# Patient Record
Sex: FEMALE | Race: WHITE | NOT HISPANIC OR LATINO | Employment: OTHER | URBAN - NONMETROPOLITAN AREA
[De-identification: names, ages, dates, MRNs, and addresses within clinical notes are randomized per-mention and may not be internally consistent; named-entity substitution may affect disease eponyms.]

---

## 2017-03-24 ENCOUNTER — OFFICE VISIT (OUTPATIENT)
Dept: CARDIOLOGY | Facility: CLINIC | Age: 72
End: 2017-03-24

## 2017-03-24 DIAGNOSIS — I25.5 ISCHEMIC CARDIOMYOPATHY: Primary | ICD-10-CM

## 2017-03-24 PROCEDURE — 93289 INTERROG DEVICE EVAL HEART: CPT | Performed by: INTERNAL MEDICINE

## 2017-10-27 ENCOUNTER — OFFICE VISIT (OUTPATIENT)
Dept: CARDIOLOGY | Facility: CLINIC | Age: 72
End: 2017-10-27

## 2017-10-27 DIAGNOSIS — I25.5 ISCHEMIC CARDIOMYOPATHY: Primary | ICD-10-CM

## 2017-10-27 PROCEDURE — 93289 INTERROG DEVICE EVAL HEART: CPT | Performed by: INTERNAL MEDICINE

## 2018-04-10 ENCOUNTER — OFFICE VISIT (OUTPATIENT)
Dept: CARDIOLOGY | Facility: CLINIC | Age: 73
End: 2018-04-10

## 2018-04-10 VITALS
BODY MASS INDEX: 23.05 KG/M2 | HEART RATE: 78 BPM | SYSTOLIC BLOOD PRESSURE: 180 MMHG | HEIGHT: 64 IN | OXYGEN SATURATION: 96 % | DIASTOLIC BLOOD PRESSURE: 86 MMHG | WEIGHT: 135 LBS

## 2018-04-10 DIAGNOSIS — R06.02 SHORTNESS OF BREATH: ICD-10-CM

## 2018-04-10 DIAGNOSIS — R07.2 PRECORDIAL PAIN: Primary | ICD-10-CM

## 2018-04-10 DIAGNOSIS — R00.2 PALPITATION: ICD-10-CM

## 2018-04-10 PROCEDURE — 93000 ELECTROCARDIOGRAM COMPLETE: CPT | Performed by: PHYSICIAN ASSISTANT

## 2018-04-10 PROCEDURE — 99214 OFFICE O/P EST MOD 30 MIN: CPT | Performed by: PHYSICIAN ASSISTANT

## 2018-04-10 RX ORDER — LINACLOTIDE 145 UG/1
CAPSULE, GELATIN COATED ORAL DAILY
COMMUNITY
Start: 2018-03-20 | End: 2021-01-01

## 2018-04-10 RX ORDER — NITROGLYCERIN 0.4 MG/1
TABLET SUBLINGUAL
Qty: 100 TABLET | Refills: 11 | Status: SHIPPED | OUTPATIENT
Start: 2018-04-10

## 2018-04-10 RX ORDER — MEMANTINE HYDROCHLORIDE 10 MG/1
10 TABLET ORAL DAILY
COMMUNITY
Start: 2018-03-20

## 2018-04-10 RX ORDER — DOCUSATE SODIUM 100 MG/1
100 CAPSULE, LIQUID FILLED ORAL 2 TIMES DAILY
COMMUNITY
End: 2020-03-18

## 2018-04-10 RX ORDER — LORATADINE 10 MG/1
CAPSULE, LIQUID FILLED ORAL DAILY
COMMUNITY
End: 2020-03-18

## 2018-04-10 RX ORDER — LISINOPRIL 40 MG/1
TABLET ORAL DAILY
COMMUNITY
Start: 2018-03-20 | End: 2021-01-01

## 2018-04-10 RX ORDER — METHOCARBAMOL 750 MG/1
750 TABLET, FILM COATED ORAL 3 TIMES DAILY
COMMUNITY
End: 2021-01-01

## 2018-04-10 RX ORDER — RISPERIDONE 0.5 MG/1
TABLET ORAL 2 TIMES DAILY
COMMUNITY
Start: 2018-03-20

## 2018-04-10 RX ORDER — RANITIDINE 150 MG/1
TABLET ORAL 2 TIMES DAILY
COMMUNITY
Start: 2018-03-20 | End: 2021-01-01

## 2018-04-10 RX ORDER — CLOPIDOGREL BISULFATE 75 MG/1
TABLET ORAL DAILY
COMMUNITY
Start: 2018-03-20 | End: 2020-02-28 | Stop reason: SDUPTHER

## 2018-04-10 RX ORDER — ONDANSETRON 4 MG/1
TABLET, FILM COATED ORAL
COMMUNITY
Start: 2018-03-28 | End: 2020-03-18

## 2018-04-10 RX ORDER — OXYCODONE HYDROCHLORIDE 15 MG/1
TABLET ORAL
COMMUNITY
Start: 2018-03-29 | End: 2020-03-18

## 2018-04-10 RX ORDER — ESTRADIOL 0.1 MG/G
CREAM VAGINAL AS NEEDED
COMMUNITY
Start: 2018-02-21 | End: 2020-03-18

## 2018-04-10 RX ORDER — SPIRONOLACTONE 25 MG/1
TABLET ORAL DAILY
COMMUNITY
Start: 2018-02-06 | End: 2020-03-18

## 2018-04-10 RX ORDER — CITALOPRAM 20 MG/1
20 TABLET ORAL DAILY
COMMUNITY
Start: 2018-02-06

## 2018-04-10 NOTE — PROGRESS NOTES
Subjective   Niki Thompson is a 72 y.o. female     Chief Complaint   Patient presents with   • Coronary Artery Disease   • Hyperlipidemia   • Hypertension   • Peripheral Vascular Disease   • COPD   • Palpitations     Here for eval.       HPI    Problem List  1)CAD s/p stenting of the LAD and RCA inD distant past   a. Follow up cath in 2013 revealed moderate LAD disease but nonobstructive otherwise  2)Hypertension  3)Dyslipidemia  4)Hx of ischemic cardiomyopathy but recent echo showing preserved systolic fxn  4)Severe hearing loss  5.  Dementia     Patient is a 72-year-old lady that presents back for follow-up.  She is accompanied by her daughter.  Patient has been experiencing intermittent episodes of chest discomfort.  She describes a slight pressure but resolves.  She does not go into detail about further characteristics of her pain.  She is experienced shortness of breath when exerting but this appears to be a chronic issue.  No PND orthopnea.  No palpitations or dysrhythmic symptoms.  Family was concerned because the patient's chest pain and shortness of breath and wanted further evaluation      Current Outpatient Prescriptions   Medication Sig Dispense Refill   • citalopram (CeleXA) 40 MG tablet Daily.     • clopidogrel (PLAVIX) 75 MG tablet Daily.     • docusate sodium (COLACE) 100 MG capsule Take 100 mg by mouth 2 (Two) Times a Day.     • ESTRACE VAGINAL 0.1 MG/GM vaginal cream As Needed.     • LINZESS 145 MCG capsule capsule Daily.     • lisinopril (PRINIVIL,ZESTRIL) 40 MG tablet Daily.     • Loratadine (CLARITIN) 10 MG capsule Take  by mouth Daily.     • LYRICA 50 MG capsule Daily.     • memantine (NAMENDA) 10 MG tablet Daily.     • methocarbamol (ROBAXIN) 750 MG tablet Take 750 mg by mouth As Needed for Muscle Spasms.     • metoprolol tartrate (LOPRESSOR) 25 MG tablet Daily.     • ondansetron (ZOFRAN) 4 MG tablet As needed     • oxyCODONE (ROXICODONE) 15 MG immediate release tablet Prn     • raNITIdine  (ZANTAC) 150 MG tablet 2 (Two) Times a Day.     • risperiDONE (risperDAL) 0.5 MG tablet 2 (Two) Times a Day.     • spironolactone (ALDACTONE) 25 MG tablet Daily.       No current facility-administered medications for this visit.        Indomethacin; Macrolides and ketolides; Penicillins; Prevacid [lansoprazole]; Rocephin [ceftriaxone]; Sulfa antibiotics; and Tetracyclines & related    Past Medical History:   Diagnosis Date   • CAD (coronary artery disease), native coronary artery 8/25/2016   • Chest pain 8/25/2016   • COPD (chronic obstructive pulmonary disease) 8/25/2016   • Coronary artery stenosis 8/25/2016   • Dyslipidemia 8/25/2016   • Esophageal reflux 8/25/2016   • Hyperlipidemia 8/25/2016   • Hypertension 8/25/2016   • Ischemic cardiomyopathy 8/25/2016   • Osteoarthritis 8/25/2016   • Peripheral vascular disease 8/25/2016   • STEMI (ST elevation myocardial infarction) 8/25/2016       Social History     Social History   • Marital status:      Spouse name: N/A   • Number of children: N/A   • Years of education: N/A     Occupational History   • Not on file.     Social History Main Topics   • Smoking status: Former Smoker   • Smokeless tobacco: Never Used   • Alcohol use No   • Drug use: No   • Sexual activity: Not on file     Other Topics Concern   • Not on file     Social History Narrative   • No narrative on file           Family History   Problem Relation Age of Onset   • Heart attack Mother    • Heart attack Father    • Heart attack Sister    • Heart attack Brother    • Diabetes Other    • Coronary artery disease Other    • Seizures Other    • Heart disease Other    • Hyperlipidemia Other    • Hypertension Other    • Stroke Other        Review of Systems   Constitutional: Positive for fatigue.   HENT: Positive for hearing loss.    Eyes: Positive for visual disturbance (glasses prn).   Respiratory: Positive for shortness of breath.    Cardiovascular: Positive for chest pain, palpitations and leg  "swelling.   Gastrointestinal: Positive for nausea.   Endocrine: Negative.    Genitourinary: Positive for frequency.   Musculoskeletal: Positive for arthralgias and myalgias.   Skin: Negative.    Allergic/Immunologic: Positive for environmental allergies.   Neurological: Positive for dizziness and light-headedness.   Hematological: Bruises/bleeds easily.   Psychiatric/Behavioral: Negative.        Objective   Vitals:    04/10/18 1124   BP: 180/86   BP Location: Left arm   Patient Position: Sitting   Pulse: 78   SpO2: 96%   Weight: 61.2 kg (135 lb)   Height: 162.6 cm (64\")      /86 (BP Location: Left arm, Patient Position: Sitting)   Pulse 78   Ht 162.6 cm (64\")   Wt 61.2 kg (135 lb)   SpO2 96%   BMI 23.17 kg/m²     Lab Results (most recent)     None          Physical Exam   Constitutional: She is oriented to person, place, and time. She appears well-developed and well-nourished. No distress.   HENT:   Head: Normocephalic and atraumatic.   Eyes: EOM are normal. Pupils are equal, round, and reactive to light.   Neck: No JVD present.   Cardiovascular: Normal rate, regular rhythm, normal heart sounds and intact distal pulses.  Exam reveals no gallop and no friction rub.    No murmur heard.  Pulmonary/Chest: Effort normal and breath sounds normal. No respiratory distress. She has no wheezes. She has no rales. She exhibits no tenderness.   Musculoskeletal: Normal range of motion. She exhibits no edema.   Neurological: She is alert and oriented to person, place, and time. No cranial nerve deficit.   Skin: Skin is warm and dry. No rash noted. No erythema. No pallor.   Psychiatric: She has a normal mood and affect. Her behavior is normal.   Nursing note and vitals reviewed.      Procedure     ECG 12 Lead  Date/Time: 4/10/2018 11:32 AM  Performed by: FEDE AGUILAR  Authorized by: FEDE AGUILAR   Comments: EKG demonstrates sinus rhythm at 69 bpm, ST depression in the anterior lateral leads at " baseline                 Assessment/Plan     Problems Addressed this Visit        Cardiovascular and Mediastinum    Palpitation    Relevant Orders    ECG 12 Lead       Respiratory    Shortness of breath    Relevant Orders    ECG 12 Lead       Nervous and Auditory    Chest pain - Primary    Relevant Orders    ECG 12 Lead      Other Visit Diagnoses    None.             Recommendation  1.  Patient and her family would like further evaluation because of her chest discomfort and shortness of breath.  Therefore, we will schedule for an ischemia asthma.  Stress test was ordered.  Echocardiogram to evaluate LV function.  2.  We recommend her continuing antiplatelet therapy.  We would like her on statin therapy department she has had issues with these in the past.  Because of her history of coronary artery disease, we recommended her being on statin therapy.  They would like to continue her current medical regimen as of now.  3.  I am sending in nitroglycerin as needed for chest pain.  Any chest pain or supple nitroglycerin, she will go to the ER.  Follow-up with primary as scheduled              Discussed the patient's BMI with her. BMI is within normal parameters. No follow-up required.         Electronically signed by:

## 2018-05-02 ENCOUNTER — HOSPITAL ENCOUNTER (OUTPATIENT)
Dept: CARDIOLOGY | Facility: HOSPITAL | Age: 73
Discharge: HOME | End: 2018-05-02

## 2018-05-02 ENCOUNTER — APPOINTMENT (OUTPATIENT)
Dept: CARDIOLOGY | Facility: HOSPITAL | Age: 73
End: 2018-05-02

## 2018-05-02 ENCOUNTER — HOSPITAL ENCOUNTER (OUTPATIENT)
Dept: CARDIOLOGY | Facility: HOSPITAL | Age: 73
End: 2018-05-02

## 2018-06-05 ENCOUNTER — OFFICE VISIT (OUTPATIENT)
Dept: CARDIOLOGY | Facility: CLINIC | Age: 73
End: 2018-06-05

## 2018-06-05 VITALS
SYSTOLIC BLOOD PRESSURE: 162 MMHG | HEART RATE: 62 BPM | OXYGEN SATURATION: 95 % | BODY MASS INDEX: 23.39 KG/M2 | DIASTOLIC BLOOD PRESSURE: 83 MMHG | HEIGHT: 64 IN | WEIGHT: 137 LBS

## 2018-06-05 DIAGNOSIS — R06.02 SHORTNESS OF BREATH: ICD-10-CM

## 2018-06-05 DIAGNOSIS — R07.2 PRECORDIAL PAIN: Primary | ICD-10-CM

## 2018-06-05 DIAGNOSIS — I25.10 CORONARY ARTERY DISEASE INVOLVING NATIVE CORONARY ARTERY OF NATIVE HEART WITHOUT ANGINA PECTORIS: ICD-10-CM

## 2018-06-05 PROCEDURE — 99214 OFFICE O/P EST MOD 30 MIN: CPT | Performed by: PHYSICIAN ASSISTANT

## 2018-06-05 NOTE — PATIENT INSTRUCTIONS
Obesity, Adult  Obesity is the condition of having too much total body fat. Being overweight or obese means that your weight is greater than what is considered healthy for your body size. Obesity is determined by a measurement called BMI. BMI is an estimate of body fat and is calculated from height and weight. For adults, a BMI of 30 or higher is considered obese.  Obesity can eventually lead to other health concerns and major illnesses, including:  · Stroke.  · Coronary artery disease (CAD).  · Type 2 diabetes.  · Some types of cancer, including cancers of the colon, breast, uterus, and gallbladder.  · Osteoarthritis.  · High blood pressure (hypertension).  · High cholesterol.  · Sleep apnea.  · Gallbladder stones.  · Infertility problems.  What are the causes?  The main cause of obesity is taking in (consuming) more calories than your body uses for energy. Other factors that contribute to this condition may include:  · Being born with genes that make you more likely to become obese.  · Having a medical condition that causes obesity. These conditions include:  ¨ Hypothyroidism.  ¨ Polycystic ovarian syndrome (PCOS).  ¨ Binge-eating disorder.  ¨ Cushing syndrome.  · Taking certain medicines, such as steroids, antidepressants, and seizure medicines.  · Not being physically active (sedentary lifestyle).  · Living where there are limited places to exercise safely or buy healthy foods.  · Not getting enough sleep.  What increases the risk?  The following factors may increase your risk of this condition:  · Having a family history of obesity.  · Being a woman of -American descent.  · Being a man of  descent.  What are the signs or symptoms?  Having excessive body fat is the main symptom of this condition.  How is this diagnosed?  This condition may be diagnosed based on:  · Your symptoms.  · Your medical history.  · A physical exam. Your health care provider may measure:  ¨ Your BMI. If you are an adult  with a BMI between 25 and less than 30, you are considered overweight. If you are an adult with a BMI of 30 or higher, you are considered obese.  ¨ The distances around your hips and your waist (circumferences). These may be compared to each other to help diagnose your condition.  ¨ Your skinfold thickness. Your health care provider may gently pinch a fold of your skin and measure it.  How is this treated?  Treatment for this condition often includes changing your lifestyle. Treatment may include some or all of the following:  · Dietary changes. Work with your health care provider and a dietitian to set a weight-loss goal that is healthy and reasonable for you. Dietary changes may include eating:  ¨ Smaller portions. A portion size is the amount of a particular food that is healthy for you to eat at one time. This varies from person to person.  ¨ Low-calorie or low-fat options.  ¨ More whole grains, fruits, and vegetables.  · Regular physical activity. This may include aerobic activity (cardio) and strength training.  · Medicine to help you lose weight. Your health care provider may prescribe medicine if you are unable to lose 1 pound a week after 6 weeks of eating more healthily and doing more physical activity.  · Surgery. Surgical options may include gastric banding and gastric bypass. Surgery may be done if:  ¨ Other treatments have not helped to improve your condition.  ¨ You have a BMI of 40 or higher.  ¨ You have life-threatening health problems related to obesity.  Follow these instructions at home:     Eating and drinking     · Follow recommendations from your health care provider about what you eat and drink. Your health care provider may advise you to:  ¨ Limit fast foods, sweets, and processed snack foods.  ¨ Choose low-fat options, such as low-fat milk instead of whole milk.  ¨ Eat 5 or more servings of fruits or vegetables every day.  ¨ Eat at home more often. This gives you more control over what you  eat.  ¨ Choose healthy foods when you eat out.  ¨ Learn what a healthy portion size is.  ¨ Keep low-fat snacks on hand.  ¨ Avoid sugary drinks, such as soda, fruit juice, iced tea sweetened with sugar, and flavored milk.  ¨ Eat a healthy breakfast.  · Drink enough water to keep your urine clear or pale yellow.  · Do not go without eating for long periods of time (do not fast) or follow a fad diet. Fasting and fad diets can be unhealthy and even dangerous.  Physical Activity   · Exercise regularly, as told by your health care provider. Ask your health care provider what types of exercise are safe for you and how often you should exercise.  · Warm up and stretch before being active.  · Cool down and stretch after being active.  · Rest between periods of activity.  Lifestyle   · Limit the time that you spend in front of your TV, computer, or video game system.  · Find ways to reward yourself that do not involve food.  · Limit alcohol intake to no more than 1 drink a day for nonpregnant women and 2 drinks a day for men. One drink equals 12 oz of beer, 5 oz of wine, or 1½ oz of hard liquor.  General instructions   · Keep a weight loss journal to keep track of the food you eat and how much you exercise you get.  · Take over-the-counter and prescription medicines only as told by your health care provider.  · Take vitamins and supplements only as told by your health care provider.  · Consider joining a support group. Your health care provider may be able to recommend a support group.  · Keep all follow-up visits as told by your health care provider. This is important.  Contact a health care provider if:  · You are unable to meet your weight loss goal after 6 weeks of dietary and lifestyle changes.  This information is not intended to replace advice given to you by your health care provider. Make sure you discuss any questions you have with your health care provider.  Document Released: 01/25/2006 Document Revised:  05/22/2017 Document Reviewed: 10/05/2016  [employer] Interactive Patient Education © 2017 [employer]  MyPlate from [employer]  The general, healthful diet is based on the 2010 Dietary Guidelines for Americans. The amount of food you need to eat from each food group depends on your age, sex, and level of physical activity and can be individualized by a dietitian. Go to ChooseMyPlate.gov for more information.  What do I need to know about the MyPlate plan?  · Enjoy your food, but eat less.  · Avoid oversized portions.  ¨ ½ of your plate should include fruits and vegetables.  ¨ ¼ of your plate should be grains.  ¨ ¼ of your plate should be protein.  Grains   · Make at least half of your grains whole grains.  · For a 2,000 calorie daily food plan, eat 6 oz every day.  · 1 oz is about 1 slice bread, 1 cup cereal, or ½ cup cooked rice, cereal, or pasta.  Vegetables   · Make half your plate fruits and vegetables.  · For a 2,000 calorie daily food plan, eat 2½ cups every day.  · 1 cup is about 1 cup raw or cooked vegetables or vegetable juice or 2 cups raw leafy greens.  Fruits   · Make half your plate fruits and vegetables.  · For a 2,000 calorie daily food plan, eat 2 cups every day.  · 1 cup is about 1 cup fruit or 100% fruit juice or ½ cup dried fruit.  Protein   · For a 2,000 calorie daily food plan, eat 5½ oz every day.  · 1 oz is about 1 oz meat, poultry, or fish, ¼ cup cooked beans, 1 egg, 1 Tbsp peanut butter, or ½ oz nuts or seeds.  Dairy   · Switch to fat-free or low-fat (1%) milk.  · For a 2,000 calorie daily food plan, eat 3 cups every day.  · 1 cup is about 1 cup milk or yogurt or soy milk (soy beverage), 1½ oz natural cheese, or 2 oz processed cheese.  Fats, Oils, and Empty Calories   · Only small amounts of oils are recommended.  · Empty calories are calories from solid fats or added sugars.  · Compare sodium in foods like soup, bread, and frozen meals. Choose the foods with lower numbers.  · Drink water instead  of sugary drinks.  What foods can I eat?  Grains   Whole grains such as whole wheat, quinoa, millet, and bulgur. Bread, rolls, and pasta made from whole grains. Brown or wild rice. Hot or cold cereals made from whole grains and without added sugar.  Vegetables   All fresh vegetables, especially fresh red, dark green, or orange vegetables. Peas and beans. Low-sodium frozen or canned vegetables prepared without added salt. Low-sodium vegetable juices.  Fruits   All fresh, frozen, and dried fruits. Canned fruit packed in water or fruit juice without added sugar. Fruit juices without added sugar.  Meats and Other Protein Sources   Boiled, baked, or grilled lean meat trimmed of fat. Skinless poultry. Fresh seafood and shellfish. Canned seafood packed in water. Unsalted nuts and unsalted nut butters. Tofu. Dried beans and pea. Eggs.  Dairy   Low-fat or fat-free milk, yogurt, and cheeses.  Sweets and Desserts   Frozen desserts made from low-fat milk.  Fats and Oils   Olive, peanut, and canola oils and margarine. Salad dressing and mayonnaise made from these oils.  Other   Soups and casseroles made from allowed ingredients and without added fat or salt.  The items listed above may not be a complete list of recommended foods or beverages. Contact your dietitian for more options.   What foods are not recommended?  Grains   Sweetened, low-fiber cereals. Packaged baked goods. Snack crackers and chips. Cheese crackers, butter crackers, and biscuits. Frozen waffles, sweet breads, doughnuts, pastries, packaged baking mixes, pancakes, cakes, and cookies.  Vegetables   Regular canned or frozen vegetables or vegetables prepared with salt. Canned tomatoes. Canned tomato sauce. Fried vegetables. Vegetables in cream sauce or cheese sauce.  Fruits   Fruits packed in syrup or made with added sugar.  Meats and Other Protein Sources   Marbled or fatty meats such as ribs. Poultry with skin. Fried meats, poultry, eggs, or fish. Sausages, hot  dogs, and deli meats such as pastrami, bologna, or salami.  Dairy   Whole milk, cream, cheeses made from whole milk, sour cream. Ice cream or yogurt made from whole milk or with added sugar.  Beverages   For adults, no more than one alcoholic drink per day. Regular soft drinks or other sugary beverages. Juice drinks.  Sweets and Desserts   Sugary or fatty desserts, candy, and other sweets.  Fats and Oils   Solid shortening or partially hydrogenated oils. Solid margarine. Margarine that contains trans fats. Butter.  The items listed above may not be a complete list of foods and beverages to avoid. Contact your dietitian for more information.   This information is not intended to replace advice given to you by your health care provider. Make sure you discuss any questions you have with your health care provider.  Document Released: 01/06/2009 Document Revised: 05/25/2017 Document Reviewed: 11/26/2014  [employer] Interactive Patient Education © 2017 [employer]  For more information:    Quit Now [place]  1-800-QUIT-NOW  https://[place].quitlogix.org/en-[place]/  Steps to Quit Smoking  Smoking tobacco can be harmful to your health and can affect almost every organ in your body. Smoking puts you, and those around you, at risk for developing many serious chronic diseases. Quitting smoking is difficult, but it is one of the best things that you can do for your health. It is never too late to quit.  What are the benefits of quitting smoking?  When you quit smoking, you lower your risk of developing serious diseases and conditions, such as:  · Lung cancer or lung disease, such as COPD.  · Heart disease.  · Stroke.  · Heart attack.  · Infertility.  · Osteoporosis and bone fractures.  Additionally, symptoms such as coughing, wheezing, and shortness of breath may get better when you quit. You may also find that you get sick less often because your body is stronger at fighting off colds and infections. If you are pregnant, quitting  smoking can help to reduce your chances of having a baby of low birth weight.  How do I get ready to quit?  When you decide to quit smoking, create a plan to make sure that you are successful. Before you quit:  · Pick a date to quit. Set a date within the next two weeks to give you time to prepare.  · Write down the reasons why you are quitting. Keep this list in places where you will see it often, such as on your bathroom mirror or in your car or wallet.  · Identify the people, places, things, and activities that make you want to smoke (triggers) and avoid them. Make sure to take these actions:  ¨ Throw away all cigarettes at home, at work, and in your car.  ¨ Throw away smoking accessories, such as ashtrays and lighters.  ¨ Clean your car and make sure to empty the ashtray.  ¨ Clean your home, including curtains and carpets.  · Tell your family, friends, and coworkers that you are quitting. Support from your loved ones can make quitting easier.  · Talk with your health care provider about your options for quitting smoking.  · Find out what treatment options are covered by your health insurance.  What strategies can I use to quit smoking?  Talk with your healthcare provider about different strategies to quit smoking. Some strategies include:  · Quitting smoking altogether instead of gradually lessening how much you smoke over a period of time. Research shows that quitting “cold turkey” is more successful than gradually quitting.  · Attending in-person counseling to help you build problem-solving skills. You are more likely to have success in quitting if you attend several counseling sessions. Even short sessions of 10 minutes can be effective.  · Finding resources and support systems that can help you to quit smoking and remain smoke-free after you quit. These resources are most helpful when you use them often. They can include:  ¨ Online chats with a counselor.  ¨ Telephone quitlines.  ¨ Printed self-help  materials.  ¨ Support groups or group counseling.  ¨ Text messaging programs.  ¨ Mobile phone applications.  · Taking medicines to help you quit smoking. (If you are pregnant or breastfeeding, talk with your health care provider first.) Some medicines contain nicotine and some do not. Both types of medicines help with cravings, but the medicines that include nicotine help to relieve withdrawal symptoms. Your health care provider may recommend:  ¨ Nicotine patches, gum, or lozenges.  ¨ Nicotine inhalers or sprays.  ¨ Non-nicotine medicine that is taken by mouth.  Talk with your health care provider about combining strategies, such as taking medicines while you are also receiving in-person counseling. Using these two strategies together makes you more likely to succeed in quitting than if you used either strategy on its own.  If you are pregnant or breastfeeding, talk with your health care provider about finding counseling or other support strategies to quit smoking. Do not take medicine to help you quit smoking unless told to do so by your health care provider.  What things can I do to make it easier to quit?  Quitting smoking might feel overwhelming at first, but there is a lot that you can do to make it easier. Take these important actions:  · Reach out to your family and friends and ask that they support and encourage you during this time. Call telephone quitlines, reach out to support groups, or work with a counselor for support.  · Ask people who smoke to avoid smoking around you.  · Avoid places that trigger you to smoke, such as bars, parties, or smoke-break areas at work.  · Spend time around people who do not smoke.  · Lessen stress in your life, because stress can be a smoking trigger for some people. To lessen stress, try:  ¨ Exercising regularly.  ¨ Deep-breathing exercises.  ¨ Yoga.  ¨ Meditating.  ¨ Performing a body scan. This involves closing your eyes, scanning your body from head to toe, and  noticing which parts of your body are particularly tense. Purposefully relax the muscles in those areas.  · Download or purchase mobile phone or tablet apps (applications) that can help you stick to your quit plan by providing reminders, tips, and encouragement. There are many free apps, such as QuitGuide from the CDC (Centers for Disease Control and Prevention). You can find other support for quitting smoking (smoking cessation) through smokefree.gov and other websites.  How will I feel when I quit smoking?  Within the first 24 hours of quitting smoking, you may start to feel some withdrawal symptoms. These symptoms are usually most noticeable 2-3 days after quitting, but they usually do not last beyond 2-3 weeks. Changes or symptoms that you might experience include:  · Mood swings.  · Restlessness, anxiety, or irritation.  · Difficulty concentrating.  · Dizziness.  · Strong cravings for sugary foods in addition to nicotine.  · Mild weight gain.  · Constipation.  · Nausea.  · Coughing or a sore throat.  · Changes in how your medicines work in your body.  · A depressed mood.  · Difficulty sleeping (insomnia).  After the first 2-3 weeks of quitting, you may start to notice more positive results, such as:  · Improved sense of smell and taste.  · Decreased coughing and sore throat.  · Slower heart rate.  · Lower blood pressure.  · Clearer skin.  · The ability to breathe more easily.  · Fewer sick days.  Quitting smoking is very challenging for most people. Do not get discouraged if you are not successful the first time. Some people need to make many attempts to quit before they achieve long-term success. Do your best to stick to your quit plan, and talk with your health care provider if you have any questions or concerns.  This information is not intended to replace advice given to you by your health care provider. Make sure you discuss any questions you have with your health care provider.  Document Released:  12/12/2002 Document Revised: 08/15/2017 Document Reviewed: 05/03/2016  [employer] Interactive Patient Education © 2017 [employer]

## 2018-06-06 RX ORDER — ISOSORBIDE MONONITRATE 30 MG/1
30 TABLET, EXTENDED RELEASE ORAL DAILY
Qty: 30 TABLET | Refills: 5 | Status: SHIPPED | OUTPATIENT
Start: 2018-06-06 | End: 2018-10-26 | Stop reason: SDUPTHER

## 2018-06-22 ENCOUNTER — OFFICE VISIT (OUTPATIENT)
Dept: CARDIOLOGY | Facility: CLINIC | Age: 73
End: 2018-06-22

## 2018-06-22 DIAGNOSIS — I25.5 ISCHEMIC CARDIOMYOPATHY: Primary | ICD-10-CM

## 2018-06-22 PROCEDURE — 93289 INTERROG DEVICE EVAL HEART: CPT | Performed by: INTERNAL MEDICINE

## 2018-10-26 DIAGNOSIS — R07.2 PRECORDIAL PAIN: ICD-10-CM

## 2018-10-26 RX ORDER — ISOSORBIDE MONONITRATE 30 MG/1
TABLET, EXTENDED RELEASE ORAL
Qty: 30 TABLET | Refills: 5 | Status: SHIPPED | OUTPATIENT
Start: 2018-10-26 | End: 2019-04-19 | Stop reason: SDUPTHER

## 2018-11-30 ENCOUNTER — OFFICE VISIT (OUTPATIENT)
Dept: CARDIOLOGY | Facility: CLINIC | Age: 73
End: 2018-11-30

## 2018-11-30 DIAGNOSIS — I25.5 ISCHEMIC CARDIOMYOPATHY: Primary | ICD-10-CM

## 2018-11-30 PROCEDURE — 93289 INTERROG DEVICE EVAL HEART: CPT | Performed by: INTERNAL MEDICINE

## 2019-04-19 DIAGNOSIS — R07.2 PRECORDIAL PAIN: ICD-10-CM

## 2019-04-19 RX ORDER — ISOSORBIDE MONONITRATE 30 MG/1
TABLET, EXTENDED RELEASE ORAL
Qty: 30 TABLET | Refills: 5 | Status: SHIPPED | OUTPATIENT
Start: 2019-04-19 | End: 2019-10-04 | Stop reason: SDUPTHER

## 2019-05-24 ENCOUNTER — OFFICE VISIT (OUTPATIENT)
Dept: CARDIOLOGY | Facility: CLINIC | Age: 74
End: 2019-05-24

## 2019-05-24 DIAGNOSIS — I25.5 ISCHEMIC CARDIOMYOPATHY: Primary | ICD-10-CM

## 2019-05-24 PROCEDURE — 93289 INTERROG DEVICE EVAL HEART: CPT | Performed by: INTERNAL MEDICINE

## 2019-10-04 DIAGNOSIS — R07.2 PRECORDIAL PAIN: ICD-10-CM

## 2019-10-04 RX ORDER — ISOSORBIDE MONONITRATE 30 MG/1
TABLET, EXTENDED RELEASE ORAL
Qty: 14 TABLET | Refills: 0 | Status: SHIPPED | OUTPATIENT
Start: 2019-10-04 | End: 2019-12-02 | Stop reason: SDUPTHER

## 2019-11-22 ENCOUNTER — OFFICE VISIT (OUTPATIENT)
Dept: CARDIOLOGY | Facility: CLINIC | Age: 74
End: 2019-11-22

## 2019-11-22 DIAGNOSIS — I25.5 ISCHEMIC CARDIOMYOPATHY: Primary | ICD-10-CM

## 2019-11-22 PROCEDURE — 93289 INTERROG DEVICE EVAL HEART: CPT | Performed by: INTERNAL MEDICINE

## 2019-12-02 DIAGNOSIS — R07.2 PRECORDIAL PAIN: ICD-10-CM

## 2019-12-02 RX ORDER — ISOSORBIDE MONONITRATE 30 MG/1
TABLET, EXTENDED RELEASE ORAL
Qty: 14 TABLET | Refills: 0 | Status: SHIPPED | OUTPATIENT
Start: 2019-12-02 | End: 2020-03-18 | Stop reason: SDUPTHER

## 2020-02-18 ENCOUNTER — TELEPHONE (OUTPATIENT)
Dept: CARDIOLOGY | Facility: CLINIC | Age: 75
End: 2020-02-18

## 2020-02-28 ENCOUNTER — OFFICE VISIT (OUTPATIENT)
Dept: CARDIOLOGY | Facility: CLINIC | Age: 75
End: 2020-02-28

## 2020-02-28 DIAGNOSIS — I25.5 ISCHEMIC CARDIOMYOPATHY: Primary | ICD-10-CM

## 2020-02-28 DIAGNOSIS — I48.0 PAROXYSMAL ATRIAL FIBRILLATION (HCC): Primary | ICD-10-CM

## 2020-02-28 PROCEDURE — 93289 INTERROG DEVICE EVAL HEART: CPT | Performed by: INTERNAL MEDICINE

## 2020-02-28 RX ORDER — METOPROLOL SUCCINATE 50 MG/1
50 TABLET, EXTENDED RELEASE ORAL DAILY
Qty: 30 TABLET | Refills: 11 | Status: SHIPPED | OUTPATIENT
Start: 2020-02-28 | End: 2021-01-01

## 2020-02-28 RX ORDER — CLOPIDOGREL BISULFATE 75 MG/1
75 TABLET ORAL DAILY
Qty: 90 TABLET | Refills: 3 | Status: SHIPPED | OUTPATIENT
Start: 2020-02-28 | End: 2020-10-26

## 2020-03-18 ENCOUNTER — TELEPHONE (OUTPATIENT)
Dept: CARDIOLOGY | Facility: CLINIC | Age: 75
End: 2020-03-18

## 2020-03-18 ENCOUNTER — OFFICE VISIT (OUTPATIENT)
Dept: CARDIOLOGY | Facility: CLINIC | Age: 75
End: 2020-03-18

## 2020-03-18 VITALS
HEART RATE: 80 BPM | HEIGHT: 64 IN | WEIGHT: 151 LBS | OXYGEN SATURATION: 96 % | BODY MASS INDEX: 25.78 KG/M2 | DIASTOLIC BLOOD PRESSURE: 88 MMHG | SYSTOLIC BLOOD PRESSURE: 151 MMHG

## 2020-03-18 DIAGNOSIS — R07.2 PRECORDIAL PAIN: ICD-10-CM

## 2020-03-18 DIAGNOSIS — I25.5 ISCHEMIC CARDIOMYOPATHY: ICD-10-CM

## 2020-03-18 DIAGNOSIS — R06.02 SHORTNESS OF BREATH: ICD-10-CM

## 2020-03-18 DIAGNOSIS — I10 HYPERTENSION, UNSPECIFIED TYPE: Primary | ICD-10-CM

## 2020-03-18 DIAGNOSIS — R00.2 PALPITATION: ICD-10-CM

## 2020-03-18 PROCEDURE — 93000 ELECTROCARDIOGRAM COMPLETE: CPT | Performed by: NURSE PRACTITIONER

## 2020-03-18 PROCEDURE — 99214 OFFICE O/P EST MOD 30 MIN: CPT | Performed by: NURSE PRACTITIONER

## 2020-03-18 RX ORDER — SUCRALFATE 1 G/1
1 TABLET ORAL 2 TIMES DAILY
COMMUNITY
End: 2021-01-01

## 2020-03-18 RX ORDER — GABAPENTIN 600 MG/1
600 TABLET ORAL 3 TIMES DAILY
COMMUNITY
Start: 2020-03-06 | End: 2020-10-26

## 2020-03-18 RX ORDER — OXYCODONE AND ACETAMINOPHEN 10; 325 MG/1; MG/1
1 TABLET ORAL EVERY 6 HOURS PRN
COMMUNITY
End: 2021-01-01

## 2020-03-18 RX ORDER — OXYCODONE AND ACETAMINOPHEN 10; 325 MG/1; MG/1
TABLET ORAL
COMMUNITY
Start: 2020-03-06 | End: 2020-03-18

## 2020-03-18 RX ORDER — PROMETHAZINE HYDROCHLORIDE 25 MG/1
12.5 TABLET ORAL 2 TIMES DAILY PRN
COMMUNITY
End: 2020-04-20

## 2020-03-18 RX ORDER — ISOSORBIDE MONONITRATE 60 MG/1
60 TABLET, EXTENDED RELEASE ORAL EVERY MORNING
Qty: 30 TABLET | Refills: 11 | Status: SHIPPED | OUTPATIENT
Start: 2020-03-18 | End: 2020-10-26

## 2020-03-18 NOTE — PROGRESS NOTES
Subjective     Niki Thompson is a 73 y.o. female who presents to day for Coronary Artery Disease (Last seen June 2018) and Hypertension.    CHIEF COMPLIANT  Chief Complaint   Patient presents with   • Coronary Artery Disease     Last seen June 2018   • Hypertension       Active Problems:  Problem List Items Addressed This Visit        Cardiovascular and Mediastinum    Hypertension - Primary    Relevant Medications    metoprolol tartrate (LOPRESSOR) 25 MG tablet    Other Relevant Orders    ECG 12 Lead    Adult Transthoracic Echo Complete W/ Cont if Necessary Per Protocol    Stress Test With Myocardial Perfusion One Day    CBC & Differential    Comprehensive Metabolic Panel    Lipid Panel    TSH    Ischemic cardiomyopathy    Relevant Medications    metoprolol tartrate (LOPRESSOR) 25 MG tablet    isosorbide mononitrate (IMDUR) 60 MG 24 hr tablet    Other Relevant Orders    Adult Transthoracic Echo Complete W/ Cont if Necessary Per Protocol    Stress Test With Myocardial Perfusion One Day    CBC & Differential    Comprehensive Metabolic Panel    Lipid Panel    TSH    Palpitation    Relevant Orders    Adult Transthoracic Echo Complete W/ Cont if Necessary Per Protocol    Stress Test With Myocardial Perfusion One Day    CBC & Differential    Comprehensive Metabolic Panel    Lipid Panel    TSH       Respiratory    Shortness of breath    Relevant Orders    Adult Transthoracic Echo Complete W/ Cont if Necessary Per Protocol    Stress Test With Myocardial Perfusion One Day    CBC & Differential    Comprehensive Metabolic Panel    Lipid Panel    TSH       Nervous and Auditory    Chest pain    Relevant Medications    isosorbide mononitrate (IMDUR) 60 MG 24 hr tablet    Other Relevant Orders    Adult Transthoracic Echo Complete W/ Cont if Necessary Per Protocol    Stress Test With Myocardial Perfusion One Day    CBC & Differential    Comprehensive Metabolic Panel    Lipid Panel    TSH      Problem List  1)CAD s/p stenting of  the LAD and RCA inD distant past   a. Follow up cath in 2013 revealed moderate LAD disease but nonobstructive otherwise  2)Hypertension  3)Dyslipidemia  4)Hx of ischemic cardiomyopathy but recent echo showing preserved systolic fxn  4)Severe hearing loss  5.  Dementia    HPI  HPI  Ms. Barahona is a 73-year-old female patient who is being evaluated today for coronary artery disease and hypertension.  However patient has been having chest pain for the last couple months that is mainly in her left chest that is intermittent in nature and occurs only for short duration.  Patient describes the pain is dull and is not increased with activity and occurs at rest.  No other associated symptoms were able to be identified.  Patient also denied any treatments or relieving factors.  Patient undergoes little activity and mainly lays on the couch for the majority of the day per family report.  Patient's extremely hard of hearing and has some confusion so review of system and information about current symptoms were limited.  Patient also reports increased leg swelling states that they swell all the time and even wake up swollen.  She does have shortness of air at times where she has to take a double breath to catch her breath.  She also becomes short of air at rest.  If she does do any exertion it also makes the shortness of air worse.  Patient has a history of multiple fractures to her nose and which increases her difficulty of breathing through her nose.  Patient reports palpitations in which she describes as racing this occurs on occasion and not all the time.  They do not affect her activities of daily living.  Also reports dizziness and lightheadedness at times as well as having no energy.  Patient does use O2 per nasal cannula as needed.  Patient denies any syncope, PND, orthopnea, or other neurological changes not mentioned above.  PRIOR MEDS  Current Outpatient Medications on File Prior to Visit   Medication Sig Dispense  Refill   • citalopram (CeleXA) 40 MG tablet Daily.     • clopidogrel (PLAVIX) 75 MG tablet Take 1 tablet by mouth Daily. 90 tablet 3   • gabapentin (NEURONTIN) 600 MG tablet Take 600 mg by mouth 3 (Three) Times a Day.     • LINZESS 145 MCG capsule capsule Daily.     • lisinopril (PRINIVIL,ZESTRIL) 40 MG tablet Daily.     • memantine (NAMENDA) 10 MG tablet Daily.     • methocarbamol (ROBAXIN) 750 MG tablet Take 750 mg by mouth 3 (Three) Times a Day.     • metoprolol succinate XL (TOPROL-XL) 50 MG 24 hr tablet Take 1 tablet by mouth Daily. 30 tablet 11   • metoprolol tartrate (LOPRESSOR) 25 MG tablet      • oxyCODONE-acetaminophen (Percocet)  MG per tablet Take 1 tablet by mouth Every 6 (Six) Hours As Needed for Moderate Pain .     • promethazine (PHENERGAN) 25 MG tablet Take 12.5 mg by mouth 2 (Two) Times a Day As Needed for Nausea or Vomiting. Half tab     • raNITIdine (ZANTAC) 150 MG tablet 2 (Two) Times a Day.     • risperiDONE (risperDAL) 0.5 MG tablet 2 (Two) Times a Day.     • sucralfate (CARAFATE) 1 g tablet Take 1 g by mouth 2 (Two) Times a Day.     • [DISCONTINUED] isosorbide mononitrate (IMDUR) 30 MG 24 hr tablet TAKE ONE TABLET BY MOUTH EVERY DAY FOR THE HEART 14 tablet 0   • nitroglycerin (NITROSTAT) 0.4 MG SL tablet 1 under the tongue as needed for angina, may repeat q5mins for up three doses 100 tablet 11   • [DISCONTINUED] docusate sodium (COLACE) 100 MG capsule Take 100 mg by mouth 2 (Two) Times a Day.     • [DISCONTINUED] ESTRACE VAGINAL 0.1 MG/GM vaginal cream As Needed.     • [DISCONTINUED] Loratadine (CLARITIN) 10 MG capsule Take  by mouth Daily.     • [DISCONTINUED] LYRICA 50 MG capsule Daily.     • [DISCONTINUED] ondansetron (ZOFRAN) 4 MG tablet As needed     • [DISCONTINUED] oxyCODONE (ROXICODONE) 15 MG immediate release tablet Prn     • [DISCONTINUED] oxyCODONE-acetaminophen (PERCOCET)  MG per tablet      • [DISCONTINUED] spironolactone (ALDACTONE) 25 MG tablet Daily.       No  current facility-administered medications on file prior to visit.        ALLERGIES  Azithromycin; Indomethacin; Macrolides and ketolides; Penicillins; Prevacid [lansoprazole]; Rocephin [ceftriaxone]; Sulfa antibiotics; and Tetracyclines & related    HISTORY  Past Medical History:   Diagnosis Date   • CAD (coronary artery disease), native coronary artery 2016   • Chest pain 2016   • COPD (chronic obstructive pulmonary disease) ([facility]) 2016   • Coronary artery stenosis 2016   • Dyslipidemia 2016   • Esophageal reflux 2016   • Hyperlipidemia 2016   • Hypertension 2016   • Ischemic cardiomyopathy 2016   • Osteoarthritis 2016   • Peripheral vascular disease (CMS/HCC) 2016   • STEMI (ST elevation myocardial infarction) (CMS/HCC) 2016       Social History     Socioeconomic History   • Marital status:      Spouse name: Not on file   • Number of children: Not on file   • Years of education: Not on file   • Highest education level: Not on file   Tobacco Use   • Smoking status: Former Smoker     Years: 62.00     Last attempt to quit: 2019     Years since quittin.3   • Smokeless tobacco: Never Used   Substance and Sexual Activity   • Alcohol use: No   • Drug use: No   • Sexual activity: Defer       Family History   Problem Relation Age of Onset   • Heart attack Mother    • Heart attack Father    • Heart attack Sister    • Heart attack Brother    • Diabetes Other    • Coronary artery disease Other    • Seizures Other    • Heart disease Other    • Hyperlipidemia Other    • Hypertension Other    • Stroke Other        Review of Systems   Constitutional: Positive for fatigue. Negative for chills and fever.   HENT: Positive for hearing loss. Negative for congestion.    Eyes: Positive for visual disturbance.   Respiratory: Positive for chest tightness and shortness of breath (O2 prn).    Cardiovascular: Positive for chest pain (today), palpitations and leg  "swelling.   Gastrointestinal: Positive for constipation and nausea. Negative for abdominal pain, blood in stool, diarrhea and vomiting.   Endocrine: Positive for heat intolerance.   Genitourinary: Positive for frequency and urgency.   Musculoskeletal: Positive for back pain and gait problem.   Skin: Negative.  Negative for rash and wound.   Allergic/Immunologic: Negative for food allergies.   Neurological: Positive for dizziness and light-headedness. Negative for syncope.   Hematological: Bruises/bleeds easily.   Psychiatric/Behavioral: Negative for sleep disturbance (denies waking with soa or cp).       Objective     VITALS: /88 (BP Location: Right arm, Patient Position: Sitting)   Pulse 80   Ht 162.6 cm (64\")   Wt 68.5 kg (151 lb)   SpO2 96%   BMI 25.92 kg/m²     LABS:   Lab Results (most recent)     None          IMAGING:   No Images in the past 120 days found..    EXAM:  Physical Exam   Constitutional: She is oriented to person, place, and time. She appears well-developed and well-nourished.   HENT:   Head: Normocephalic and atraumatic.   Eyes: Pupils are equal, round, and reactive to light. EOM are normal.   Neck: Trachea normal and phonation normal. Neck supple. No JVD present. Carotid bruit is not present. No thyroid mass present.   Cardiovascular: Normal rate, regular rhythm and intact distal pulses. Exam reveals no gallop and no friction rub.   Murmur heard.  Pulses:       Radial pulses are 2+ on the right side, and 2+ on the left side.        Posterior tibial pulses are 2+ on the right side, and 2+ on the left side.   Pulmonary/Chest: Effort normal. No respiratory distress. She has decreased breath sounds. She has no wheezes. She has rhonchi in the right upper field and the left upper field. She has no rales.   Abdominal: Soft. Bowel sounds are normal. She exhibits no distension and no abdominal bruit. There is no tenderness.   Musculoskeletal: Normal range of motion. She exhibits edema.   "   Neurological: She is alert and oriented to person, place, and time. She has normal strength. No cranial nerve deficit or sensory deficit.   Skin: Skin is warm, dry and intact. Capillary refill takes less than 2 seconds. No rash noted.   Psychiatric: She has a normal mood and affect. Her speech is normal and behavior is normal. Judgment and thought content normal. Cognition and memory are normal.   Nursing note and vitals reviewed.      Procedure     ECG 12 Lead  Date/Time: 3/18/2020 3:13 PM  Performed by: Sal Varma APRN  Authorized by: Sal Varma APRN   Comparison: compared with previous ECG from 4/10/2018  Similar to previous ECG  Comparison to previous ECG: ST depression in the lateral leads is actually decreased from previous EKG  Rhythm: sinus rhythm  Rate: normal  BPM: 70  ST Elevation: V5, V6 and I  QRS axis: normal    Clinical impression: abnormal EKG               Assessment/Plan    Diagnosis Plan   1. Hypertension, unspecified type  ECG 12 Lead    Adult Transthoracic Echo Complete W/ Cont if Necessary Per Protocol    Stress Test With Myocardial Perfusion One Day    CBC & Differential    Comprehensive Metabolic Panel    Lipid Panel    TSH   2. Precordial pain  Adult Transthoracic Echo Complete W/ Cont if Necessary Per Protocol    Stress Test With Myocardial Perfusion One Day    isosorbide mononitrate (IMDUR) 60 MG 24 hr tablet    CBC & Differential    Comprehensive Metabolic Panel    Lipid Panel    TSH   3. Shortness of breath  Adult Transthoracic Echo Complete W/ Cont if Necessary Per Protocol    Stress Test With Myocardial Perfusion One Day    CBC & Differential    Comprehensive Metabolic Panel    Lipid Panel    TSH   4. Ischemic cardiomyopathy  Adult Transthoracic Echo Complete W/ Cont if Necessary Per Protocol    Stress Test With Myocardial Perfusion One Day    CBC & Differential    Comprehensive Metabolic Panel    Lipid Panel    TSH   5. Palpitation  Adult Transthoracic Echo Complete W/  Cont if Necessary Per Protocol    Stress Test With Myocardial Perfusion One Day    CBC & Differential    Comprehensive Metabolic Panel    Lipid Panel    TSH   1.  Due to patient's chest pain, shortness of breath, dizziness and history of ischemic cardiomyopathy I think it is appropriate to move forth with stress test to evaluate for ischemia.  I will also like to order a echocardiogram to check for worsening heart failure due to patient's new symptoms.  Informed of the procedures benefits and risk patient and daughter verbalized to move forth with the testing.  2.  Patient does have essential hypertension which her blood pressure is elevated today at 151/88 with a heart rate of 80.  After discussion with the daughter and patient it was found that patient normally runs a normal blood pressure at home.  Patient advised to monitor his blood pressure on a daily basis and report any significant highs or lows.  3.  Patient has not had labs performed in quite some time per patient and daughter report.  I would like to reevaluate patient with a CBC, CMP, lipid panel, and TSH.  To help and risk factor modification as well as potentially identified potential causes of some of her symptoms.  Patient verbalizes that she is very hard stick so I would like to have these obtained on the same day as the stress test will reinitiate the IV so patient only has to be poked once.  4.  Informed of signs and symptoms of ACS and advised to seek emergent treatment for any new worsening symptoms.  Patient also advised sooner follow-up as needed.  Also advised to follow-up with family doctor as needed  5.  Due to patient's increasing chest pain I feel it is appropriate to increase her Imdur to 60 mg daily in order to combat and hopefully reduce her episodes of chest pain.    Return in about 3 months (around 6/18/2020), or if symptoms worsen or fail to improve, for with ICD Check.    Niki was seen today for coronary artery disease and  hypertension.    Diagnoses and all orders for this visit:    Hypertension, unspecified type  -     ECG 12 Lead  -     Adult Transthoracic Echo Complete W/ Cont if Necessary Per Protocol; Future  -     Stress Test With Myocardial Perfusion One Day; Future  -     CBC & Differential; Future  -     Comprehensive Metabolic Panel; Future  -     Lipid Panel; Future  -     TSH; Future    Precordial pain  -     Adult Transthoracic Echo Complete W/ Cont if Necessary Per Protocol; Future  -     Stress Test With Myocardial Perfusion One Day; Future  -     isosorbide mononitrate (IMDUR) 60 MG 24 hr tablet; Take 1 tablet by mouth Every Morning.  -     CBC & Differential; Future  -     Comprehensive Metabolic Panel; Future  -     Lipid Panel; Future  -     TSH; Future    Shortness of breath  -     Adult Transthoracic Echo Complete W/ Cont if Necessary Per Protocol; Future  -     Stress Test With Myocardial Perfusion One Day; Future  -     CBC & Differential; Future  -     Comprehensive Metabolic Panel; Future  -     Lipid Panel; Future  -     TSH; Future    Ischemic cardiomyopathy  -     Adult Transthoracic Echo Complete W/ Cont if Necessary Per Protocol; Future  -     Stress Test With Myocardial Perfusion One Day; Future  -     CBC & Differential; Future  -     Comprehensive Metabolic Panel; Future  -     Lipid Panel; Future  -     TSH; Future    Palpitation  -     Adult Transthoracic Echo Complete W/ Cont if Necessary Per Protocol; Future  -     Stress Test With Myocardial Perfusion One Day; Future  -     CBC & Differential; Future  -     Comprehensive Metabolic Panel; Future  -     Lipid Panel; Future  -     TSH; Future        Niki SENIOR Donna  reports that she quit smoking about 3 months ago. She quit after 62.00 years of use. She has never used smokeless tobacco..         Patient's Body mass index is 25.92 kg/m². BMI is within normal parameters. No follow-up required..           MEDS ORDERED DURING VISIT:  New Medications  Ordered This Visit   Medications   • isosorbide mononitrate (IMDUR) 60 MG 24 hr tablet     Sig: Take 1 tablet by mouth Every Morning.     Dispense:  30 tablet     Refill:  11           This document has been electronically signed by Sal Varma Jr., NANDO  March 18, 2020 17:44

## 2020-03-18 NOTE — PATIENT INSTRUCTIONS
"Fat and Cholesterol Restricted Eating Plan  Getting too much fat and cholesterol in your diet may cause health problems. Choosing the right foods helps keep your fat and cholesterol at normal levels. This can keep you from getting certain diseases.  Your doctor may recommend an eating plan that includes:  · Total fat: ______% or less of total calories a day.  · Saturated fat: ______% or less of total calories a day.  · Cholesterol: less than _________mg a day.  · Fiber: ______g a day.  What are tips for following this plan?  Meal planning  · At meals, divide your plate into four equal parts:  ? Fill one-half of your plate with vegetables and green salads.  ? Fill one-fourth of your plate with whole grains.  ? Fill one-fourth of your plate with low-fat (lean) protein foods.  · Eat fish that is high in omega-3 fats at least two times a week. This includes mackerel, tuna, sardines, and salmon.  · Eat foods that are high in fiber, such as whole grains, beans, apples, broccoli, carrots, peas, and barley.  General tips    · Work with your doctor to lose weight if you need to.  · Avoid:  ? Foods with added sugar.  ? Fried foods.  ? Foods with partially hydrogenated oils.  · Limit alcohol intake to no more than 1 drink a day for nonpregnant women and 2 drinks a day for men. One drink equals 12 oz of beer, 5 oz of wine, or 1½ oz of hard liquor.  Reading food labels  · Check food labels for:  ? Trans fats.  ? Partially hydrogenated oils.  ? Saturated fat (g) in each serving.  ? Cholesterol (mg) in each serving.  ? Fiber (g) in each serving.  · Choose foods with healthy fats, such as:  ? Monounsaturated fats.  ? Polyunsaturated fats.  ? Omega-3 fats.  · Choose grain products that have whole grains. Look for the word \"whole\" as the first word in the ingredient list.  Cooking  · Cook foods using low-fat methods. These include baking, boiling, grilling, and broiling.  · Eat more home-cooked foods. Eat at restaurants and buffets " less often.  · Avoid cooking using saturated fats, such as butter, cream, palm oil, palm kernel oil, and coconut oil.  Recommended foods    Fruits  · All fresh, canned (in natural juice), or frozen fruits.  Vegetables  · Fresh or frozen vegetables (raw, steamed, roasted, or grilled). Green salads.  Grains  · Whole grains, such as whole wheat or whole grain breads, crackers, cereals, and pasta. Unsweetened oatmeal, bulgur, barley, quinoa, or brown rice. Corn or whole wheat flour tortillas.  Meats and other protein foods  · Ground beef (85% or leaner), grass-fed beef, or beef trimmed of fat. Skinless chicken or turkey. Ground chicken or turkey. Pork trimmed of fat. All fish and seafood. Egg whites. Dried beans, peas, or lentils. Unsalted nuts or seeds. Unsalted canned beans. Nut butters without added sugar or oil.  Dairy  · Low-fat or nonfat dairy products, such as skim or 1% milk, 2% or reduced-fat cheeses, low-fat and fat-free ricotta or cottage cheese, or plain low-fat and nonfat yogurt.  Fats and oils  · Tub margarine without trans fats. Light or reduced-fat mayonnaise and salad dressings. Avocado. Olive, canola, sesame, or safflower oils.  The items listed above may not be a complete list of foods and beverages you can eat. Contact a dietitian for more information.  Foods to avoid  Fruits  · Canned fruit in heavy syrup. Fruit in cream or butter sauce. Fried fruit.  Vegetables  · Vegetables cooked in cheese, cream, or butter sauce. Fried vegetables.  Grains  · White bread. White pasta. White rice. Cornbread. Bagels, pastries, and croissants. Crackers and snack foods that contain trans fat and hydrogenated oils.  Meats and other protein foods  · Fatty cuts of meat. Ribs, chicken wings, caraballo, sausage, bologna, salami, chitterlings, fatback, hot dogs, bratwurst, and packaged lunch meats. Liver and organ meats. Whole eggs and egg yolks. Chicken and turkey with skin. Fried meat.  Dairy  · Whole or 2% milk, cream,  half-and-half, and cream cheese. Whole milk cheeses. Whole-fat or sweetened yogurt. Full-fat cheeses. Nondairy creamers and whipped toppings. Processed cheese, cheese spreads, and cheese curds.  Beverages  · Alcohol. Sugar-sweetened drinks such as sodas, lemonade, and fruit drinks.  Fats and oils  · Butter, stick margarine, lard, shortening, ghee, or caraballo fat. Coconut, palm kernel, and palm oils.  Sweets and desserts  · Corn syrup, sugars, honey, and molasses. Candy. Jam and jelly. Syrup. Sweetened cereals. Cookies, pies, cakes, donuts, muffins, and ice cream.  The items listed above may not be a complete list of foods and beverages you should avoid. Contact a dietitian for more information.  Summary  · Choosing the right foods helps keep your fat and cholesterol at normal levels. This can keep you from getting certain diseases.  · At meals, fill one-half of your plate with vegetables and green salads.  · Eat high-fiber foods, like whole grains, beans, apples, carrots, peas, and barley.  · Limit added sugar, saturated fats, alcohol, and fried foods.  This information is not intended to replace advice given to you by your health care provider. Make sure you discuss any questions you have with your health care provider.  Document Released: 06/18/2013 Document Revised: 08/21/2019 Document Reviewed: 09/04/2018  [employer] Interactive Patient Education © 2020 [employer]    Acute Coronary Syndrome    Acute coronary syndrome (ACS) is a serious problem in which there is suddenly not enough blood and oxygen reaching the heart. ACS can result in chest pain or a heart attack.  This condition is a medical emergency. If you have any symptoms of this condition, get help right away.  What are the causes?  This condition may be caused by:  · Buildup of fat and cholesterol inside of the arteries (atherosclerosis). This is the most common cause. The buildup (plaque) can cause blood vessels in the heart (coronary arteries) to become  narrow or blocked, which reduces blood flow to the heart. Plaque can also break off and lead to a clot, which can block an artery and cause a heart attack or stroke.  · Sudden tightening of the muscles around the coronary arteries (coronary spasm).  · Tearing of a coronary artery (spontaneous coronary artery dissection).  · Very low blood pressure (hypotension).  · An abnormal heartbeat (arrhythmia).  · Other medical conditions that cause a decrease of oxygen to the heart, such as anemiaorrespiratory failure.  · Using cocaine or methamphetamine.  What increases the risk?  The following factors may make you more likely to develop this condition:  · Age. The risk for ACS increases as you get older.  · History of chest pain, heart attack, peripheral artery disease, or stroke.  · Having taken chemotherapy or immune-suppressing medicines.  · Being male.  · Family history of chest pain, heart disease, or stroke.  · Smoking.  · Not exercising enough.  · Being overweight.  · High cholesterol.  · High blood pressure (hypertension).  · Diabetes.  · Excessive alcohol use.  What are the signs or symptoms?  Common symptoms of this condition include:  · Chest pain. The pain may last a long time, or it may stop and come back (recur). It may feel like:  ? Crushing or squeezing.  ? Tightness, pressure, fullness, or heaviness.  · Arm, neck, jaw, or back pain.  · Heartburn or indigestion.  · Shortness of breath.  · Nausea.  · Sudden cold sweats.  · Light-headedness.  · Dizziness, or passing out.  · Tiredness (fatigue).  Sometimes there are no symptoms.  How is this diagnosed?  This condition may be diagnosed based on:  · Your medical history and symptoms.  · An electrocardiogram (ECG). This imaging test measures the heart's electrical activity.  · Blood tests. Cardiac blood tests may need to be repeated at designated time intervals.  · Chest X-ray.  · A CT scan of the chest.  · A coronary angiogram. This is a procedure in which dye is  injected into the bloodstream and then X-rays are taken to show if there is a blockage in a coronary artery.  · Exercise stress testing.  · Echocardiography. This is a test that uses sound waves to produce detailed images of the heart.  How is this treated?  The treatment is to restore blood flow to the heart as soon as possible. Treatment for this condition may include:  · Oxygen therapy.  · Medicines, such as:  ? Antiplatelet medicines and blood-thinning medicines, such as aspirin. These help prevent blood clots.  ? Medicine that dissolves any blood clots (fibrinolytic therapy).  ? Blood pressure medicines.  ? Nitroglycerin. This helps relieve chest pain and widens blood vessels to improve blood flow.  ? Pain medicine.  ? Cholesterol-lowering medicine.  · Surgery, such as:  ? Coronary angioplasty with stent placement. This involves placing a small piece of metal that looks like mesh or a spring into a narrow coronary artery. This widens the artery and keep it open.  ? Coronary artery bypass surgery. This involves taking a section of a blood vessel from a different part of your body, and placing it on the blocked coronary artery to allow blood to flow around (bypass) the blockage.  · Cardiac rehabilitation. This is a program that helps improve your health and well-being. It includes exercise training, education, and counseling to help you recover.  Follow these instructions at home:  Eating and drinking  · Eat a heart-healthy diet that includes whole grains, fruits and vegetables, lean proteins, and low-fat or nonfat dairy products.  · Limit how much salt (sodium) you eat as told by your health care provider. Follow instructions from your health care provider about any other eating or drinking restrictions, such as limiting foods that are high in fat and processed sugars.  · Use healthy cooking methods such as roasting, grilling, broiling, baking, poaching, steaming, or stir-frying.  · Talk with a dietitian to  learn about healthy cooking methods and how to eat less sodium.  Medicines  · Take over-the-counter and prescription medicines only as told by your health care provider.  · Do not take these medicines unless your health care provider approves:  ? Vitamin supplements that contain vitamin A or vitamin E.  ? Nonsteroidal anti-inflammatory drugs (NSAIDs), such as ibuprofen, naproxen, or celecoxib.  ? Hormone replacement therapy that contains estrogen.  If you are taking blood thinners:  · Talk with your health care provider before you take any medicines that contain aspirin or NSAIDs. These medicines increase your risk for dangerous bleeding.  · Take your medicine exactly as told, at the same time every day.  · Avoid activities that could cause injury or bruising, and follow instructions about how to prevent falls.  · Wear a medical alert bracelet, and carry a card that lists what medicines you take.  Activity  · Join a cardiac rehabilitation program. An exercise plan will be developed for you.  · Ask your health care provider:  ? What activities and exercises are safe for you.  ? If you should follow specific instructions about lifting, driving, or climbing stairs.  Lifestyle  · Do not use any products that contain nicotine or tobacco, such as cigarettes and e-cigarettes. If you need help quitting, ask your health care provider.  · If your health care provider says that alcohol is safe for you, limit your alcohol intake to no more than 1 drink a day. One drink equals 12 oz of beer, 5 oz of wine, or 1½ oz of hard liquor.  · Maintain a healthy weight. If you need to lose weight, work with your health care provider to do so safely.  General instructions  · Tell all the health care providers who care for you about your heart condition, including your dentist. This may affect the medicines or treatment you receive.  · Manage any other health conditions you have, such as hypertension or diabetes. These conditions affect your  heart.  · Learn ways to manage stress.  · Get screened for depression, and get mental health treatment if you need it. People with ACS are at higher risk for depression.  · Keep your vaccinations up to date. Get the flu shot (influenza vaccine) every year.  · If directed, monitor your blood pressure at home.  · Keep all follow-up visits as told by your health care provider. This is important.  Contact a health care provider if:  · You feel overwhelmed or sad.  · You have trouble doing your daily activities.  Get help right away if:  · You have pain in your chest, neck, arm, jaw, stomach, or back that recurs, and:  ? It lasts for more than a few minutes.  ? It is not relieved by taking the medicineyour health care provider prescribed.  · You have unexplained:  ? Heavy sweating.  ? Heartburn or indigestion.  ? Nausea or vomiting.  ? Shortness of breath.  ? Difficulty breathing.  ? Fatigue.  ? Nervousness or anxiety.  ? Weakness.  ? Diarrhea.  ? Dark stools or blood in your stool.  · You have sudden light-headedness or dizziness.  · Your blood pressure is higher than 180/120.  · You faint.  · You have thoughts about hurting yourself.  These symptoms may represent a serious problem that is an emergency. Do not wait to see if the symptoms will go away. Get medical help right away. Call your local emergency services (143 in the [place] Do not drive yourself to the hospital.   If you ever feel like you may hurt yourself or others, or have thoughts about taking your own life, get help right away. You can go to your nearest emergency department or call:  · Emergency services (790 in the [place]  · A suicide crisis helpline, such as the National Suicide Prevention Lifeline at 1-139.142.9662. This is open 24 hours a day.  Summary  · Acute coronary syndrome (ACS) is when there is not enough blood and oxygen being supplied to the heart. ACS can result in chest pain or a heart attack.  · Acute coronary syndrome is a medical  emergency. If you have any symptoms of this condition, get help right away.  · Treatment includes medicines and procedures to open the blocked arteries and restore blood flow.  This information is not intended to replace advice given to you by your health care provider. Make sure you discuss any questions you have with your health care provider.  Document Released: 12/18/2006 Document Revised: 08/28/2018 Document Reviewed: 08/28/2018  [employer] Interactive Patient Education © 2019 [employer]

## 2020-04-15 ENCOUNTER — HOSPITAL ENCOUNTER (OUTPATIENT)
Dept: CARDIOLOGY | Facility: HOSPITAL | Age: 75
Discharge: HOME | End: 2020-04-15

## 2020-04-15 ENCOUNTER — LAB (OUTPATIENT)
Dept: LAB | Facility: HOSPITAL | Age: 75
End: 2020-04-15

## 2020-04-15 DIAGNOSIS — R06.02 SHORTNESS OF BREATH: ICD-10-CM

## 2020-04-15 DIAGNOSIS — I25.5 ISCHEMIC CARDIOMYOPATHY: ICD-10-CM

## 2020-04-15 DIAGNOSIS — R07.2 PRECORDIAL PAIN: ICD-10-CM

## 2020-04-15 DIAGNOSIS — R00.2 PALPITATION: ICD-10-CM

## 2020-04-15 DIAGNOSIS — I10 HYPERTENSION, UNSPECIFIED TYPE: ICD-10-CM

## 2020-04-15 LAB
ALBUMIN SERPL-MCNC: 3.8 G/DL (ref 3.5–5.2)
ALBUMIN/GLOB SERPL: 1.2 G/DL
ALP SERPL-CCNC: 96 U/L (ref 39–117)
ALT SERPL W P-5'-P-CCNC: 8 U/L (ref 1–33)
ANION GAP SERPL CALCULATED.3IONS-SCNC: 14.2 MMOL/L (ref 5–15)
AST SERPL-CCNC: 13 U/L (ref 1–32)
BASOPHILS # BLD AUTO: 0.03 10*3/MM3 (ref 0–0.2)
BASOPHILS NFR BLD AUTO: 0.6 % (ref 0–1.5)
BILIRUB SERPL-MCNC: 0.2 MG/DL (ref 0.2–1.2)
BUN BLD-MCNC: 20 MG/DL (ref 8–23)
BUN/CREAT SERPL: 16.5 (ref 7–25)
CALCIUM SPEC-SCNC: 9.8 MG/DL (ref 8.6–10.5)
CHLORIDE SERPL-SCNC: 100 MMOL/L (ref 98–107)
CHOLEST SERPL-MCNC: 163 MG/DL (ref 0–200)
CO2 SERPL-SCNC: 25.8 MMOL/L (ref 22–29)
CREAT BLD-MCNC: 1.21 MG/DL (ref 0.57–1)
DEPRECATED RDW RBC AUTO: 49.1 FL (ref 37–54)
EOSINOPHIL # BLD AUTO: 0.24 10*3/MM3 (ref 0–0.4)
EOSINOPHIL NFR BLD AUTO: 4.6 % (ref 0.3–6.2)
ERYTHROCYTE [DISTWIDTH] IN BLOOD BY AUTOMATED COUNT: 13.9 % (ref 12.3–15.4)
GFR SERPL CREATININE-BSD FRML MDRD: 43 ML/MIN/1.73
GLOBULIN UR ELPH-MCNC: 3.3 GM/DL
GLUCOSE BLD-MCNC: 151 MG/DL (ref 65–99)
HCT VFR BLD AUTO: 39.2 % (ref 34–46.6)
HDLC SERPL-MCNC: 38 MG/DL (ref 40–60)
HGB BLD-MCNC: 12.1 G/DL (ref 12–15.9)
IMM GRANULOCYTES # BLD AUTO: 0.02 10*3/MM3 (ref 0–0.05)
IMM GRANULOCYTES NFR BLD AUTO: 0.4 % (ref 0–0.5)
LDLC SERPL CALC-MCNC: 93 MG/DL (ref 0–100)
LDLC/HDLC SERPL: 2.46 {RATIO}
LYMPHOCYTES # BLD AUTO: 1.42 10*3/MM3 (ref 0.7–3.1)
LYMPHOCYTES NFR BLD AUTO: 27.4 % (ref 19.6–45.3)
MCH RBC QN AUTO: 29.8 PG (ref 26.6–33)
MCHC RBC AUTO-ENTMCNC: 30.9 G/DL (ref 31.5–35.7)
MCV RBC AUTO: 96.6 FL (ref 79–97)
MONOCYTES # BLD AUTO: 0.29 10*3/MM3 (ref 0.1–0.9)
MONOCYTES NFR BLD AUTO: 5.6 % (ref 5–12)
NEUTROPHILS # BLD AUTO: 3.19 10*3/MM3 (ref 1.7–7)
NEUTROPHILS NFR BLD AUTO: 61.4 % (ref 42.7–76)
NRBC BLD AUTO-RTO: 0 /100 WBC (ref 0–0.2)
PLATELET # BLD AUTO: 147 10*3/MM3 (ref 140–450)
PMV BLD AUTO: 11.3 FL (ref 6–12)
POTASSIUM BLD-SCNC: 3.9 MMOL/L (ref 3.5–5.2)
PROT SERPL-MCNC: 7.1 G/DL (ref 6–8.5)
RBC # BLD AUTO: 4.06 10*6/MM3 (ref 3.77–5.28)
SODIUM BLD-SCNC: 140 MMOL/L (ref 136–145)
TRIGL SERPL-MCNC: 158 MG/DL (ref 0–150)
TSH SERPL DL<=0.05 MIU/L-ACNC: 2.27 UIU/ML (ref 0.27–4.2)
VLDLC SERPL-MCNC: 31.6 MG/DL
WBC NRBC COR # BLD: 5.19 10*3/MM3 (ref 3.4–10.8)

## 2020-04-15 PROCEDURE — 80053 COMPREHEN METABOLIC PANEL: CPT | Performed by: NURSE PRACTITIONER

## 2020-04-15 PROCEDURE — 93018 CV STRESS TEST I&R ONLY: CPT | Performed by: INTERNAL MEDICINE

## 2020-04-15 PROCEDURE — A9500 TC99M SESTAMIBI: HCPCS | Performed by: INTERNAL MEDICINE

## 2020-04-15 PROCEDURE — 93306 TTE W/DOPPLER COMPLETE: CPT | Performed by: INTERNAL MEDICINE

## 2020-04-15 PROCEDURE — 84443 ASSAY THYROID STIM HORMONE: CPT | Performed by: NURSE PRACTITIONER

## 2020-04-15 PROCEDURE — 93306 TTE W/DOPPLER COMPLETE: CPT

## 2020-04-15 PROCEDURE — 0 TECHNETIUM SESTAMIBI: Performed by: INTERNAL MEDICINE

## 2020-04-15 PROCEDURE — 78452 HT MUSCLE IMAGE SPECT MULT: CPT | Performed by: INTERNAL MEDICINE

## 2020-04-15 PROCEDURE — 78452 HT MUSCLE IMAGE SPECT MULT: CPT

## 2020-04-15 PROCEDURE — 80061 LIPID PANEL: CPT | Performed by: NURSE PRACTITIONER

## 2020-04-15 PROCEDURE — 93016 CV STRESS TEST SUPVJ ONLY: CPT | Performed by: NURSE PRACTITIONER

## 2020-04-15 PROCEDURE — 25010000002 REGADENOSON 0.4 MG/5ML SOLUTION: Performed by: INTERNAL MEDICINE

## 2020-04-15 PROCEDURE — 85025 COMPLETE CBC W/AUTO DIFF WBC: CPT | Performed by: NURSE PRACTITIONER

## 2020-04-15 PROCEDURE — 36415 COLL VENOUS BLD VENIPUNCTURE: CPT

## 2020-04-15 PROCEDURE — 93017 CV STRESS TEST TRACING ONLY: CPT

## 2020-04-15 RX ADMIN — TECHNETIUM TC 99M SESTAMIBI 1 DOSE: 1 INJECTION INTRAVENOUS at 10:38

## 2020-04-15 RX ADMIN — REGADENOSON 0.4 MG: 0.08 INJECTION, SOLUTION INTRAVENOUS at 10:38

## 2020-04-15 RX ADMIN — TECHNETIUM TC 99M SESTAMIBI 1 DOSE: 1 INJECTION INTRAVENOUS at 10:37

## 2020-04-16 ENCOUNTER — TELEPHONE (OUTPATIENT)
Dept: CARDIOLOGY | Facility: CLINIC | Age: 75
End: 2020-04-16

## 2020-04-16 LAB
BH CV STRESS COMMENTS STAGE 1: NORMAL
BH CV STRESS DOSE REGADENOSON STAGE 1: 0.4
BH CV STRESS DURATION MIN STAGE 1: 0
BH CV STRESS DURATION SEC STAGE 1: 10
BH CV STRESS PROTOCOL 1: NORMAL
BH CV STRESS RECOVERY BP: NORMAL MMHG
BH CV STRESS RECOVERY HR: 96 BPM
BH CV STRESS STAGE 1: 1
MAXIMAL PREDICTED HEART RATE: 145 BPM
PERCENT MAX PREDICTED HR: 58.62 %
STRESS BASELINE BP: NORMAL MMHG
STRESS BASELINE HR: 70 BPM
STRESS PERCENT HR: 69 %
STRESS POST PEAK BP: NORMAL MMHG
STRESS POST PEAK HR: 85 BPM
STRESS TARGET HR: 123 BPM

## 2020-04-16 NOTE — TELEPHONE ENCOUNTER
Informed pt's PoA of results and the telephone appt on 4/20 at 10:15am, she verbalized understanding.

## 2020-04-16 NOTE — TELEPHONE ENCOUNTER
----- Message from NANDO Good sent at 4/16/2020 12:33 AM EDT -----  Regarding: FW:  Decreased renal function. Creatinine 1.21 and GFR 43.  Triglycerides were also elevated. Would advise to reduce carbs in diet  ----- Message -----  From: Lab, Background User  Sent: 4/15/2020   6:26 PM EDT  To: NANDO Good

## 2020-04-16 NOTE — TELEPHONE ENCOUNTER
----- Message from Nancy Del Rio sent at 4/16/2020  2:49 PM EDT -----  04/20 @10:15AM TELEPHONE VISIT      ----- Message -----  From: Malena Contreras  Sent: 4/16/2020   9:19 AM EDT  To: Gabriela Antonio  Pool    Please schedule pt for an appt, then route to clinical staff. Thank you!   ----- Message -----  From: Sal Varma APRN  Sent: 4/16/2020   8:29 AM EDT  To: Malena Contreras    Positive stress test 1 to 2-week follow-up.  ----- Message -----  From: Brennan Antonio MD  Sent: 4/16/2020   8:20 AM EDT  To: NANDO Good

## 2020-04-20 ENCOUNTER — OFFICE VISIT (OUTPATIENT)
Dept: CARDIOLOGY | Facility: CLINIC | Age: 75
End: 2020-04-20

## 2020-04-20 VITALS
SYSTOLIC BLOOD PRESSURE: 126 MMHG | DIASTOLIC BLOOD PRESSURE: 89 MMHG | HEART RATE: 75 BPM | HEIGHT: 64 IN | BODY MASS INDEX: 25.92 KG/M2

## 2020-04-20 DIAGNOSIS — R00.2 PALPITATIONS: ICD-10-CM

## 2020-04-20 DIAGNOSIS — R06.02 SHORTNESS OF BREATH: ICD-10-CM

## 2020-04-20 DIAGNOSIS — R94.39 POSITIVE CARDIAC STRESS TEST: Primary | ICD-10-CM

## 2020-04-20 LAB
BH CV ECHO MEAS - ACS: 1.6 CM
BH CV ECHO MEAS - AO MAX PG: 3.4 MMHG
BH CV ECHO MEAS - AO MEAN PG: 2 MMHG
BH CV ECHO MEAS - AO ROOT AREA (BSA CORRECTED): 1.6
BH CV ECHO MEAS - AO ROOT AREA: 5.9 CM^2
BH CV ECHO MEAS - AO ROOT DIAM: 2.8 CM
BH CV ECHO MEAS - AO V2 MAX: 91.8 CM/SEC
BH CV ECHO MEAS - AO V2 MEAN: 63 CM/SEC
BH CV ECHO MEAS - AO V2 VTI: 20.2 CM
BH CV ECHO MEAS - BSA(HAYCOCK): 1.8 M^2
BH CV ECHO MEAS - BSA: 1.7 M^2
BH CV ECHO MEAS - BZI_BMI: 25.9 KILOGRAMS/M^2
BH CV ECHO MEAS - BZI_METRIC_HEIGHT: 162.6 CM
BH CV ECHO MEAS - BZI_METRIC_WEIGHT: 68.5 KG
BH CV ECHO MEAS - EDV(CUBED): 49 ML
BH CV ECHO MEAS - EDV(MOD-SP4): 41.5 ML
BH CV ECHO MEAS - EDV(TEICH): 56.6 ML
BH CV ECHO MEAS - EF(CUBED): 78 %
BH CV ECHO MEAS - EF(MOD-SP4): 23.4 %
BH CV ECHO MEAS - EF(TEICH): 71.1 %
BH CV ECHO MEAS - ESV(CUBED): 10.8 ML
BH CV ECHO MEAS - ESV(MOD-SP4): 31.8 ML
BH CV ECHO MEAS - ESV(TEICH): 16.4 ML
BH CV ECHO MEAS - FS: 39.6 %
BH CV ECHO MEAS - IVS/LVPW: 1.2
BH CV ECHO MEAS - IVSD: 1.8 CM
BH CV ECHO MEAS - LA DIMENSION: 3.2 CM
BH CV ECHO MEAS - LA/AO: 1.2
BH CV ECHO MEAS - LV DIASTOLIC VOL/BSA (35-75): 23.9 ML/M^2
BH CV ECHO MEAS - LV IVRT: 0.14 SEC
BH CV ECHO MEAS - LV MASS(C)D: 231.9 GRAMS
BH CV ECHO MEAS - LV MASS(C)DI: 133.6 GRAMS/M^2
BH CV ECHO MEAS - LV SYSTOLIC VOL/BSA (12-30): 18.3 ML/M^2
BH CV ECHO MEAS - LVIDD: 3.7 CM
BH CV ECHO MEAS - LVIDS: 2.2 CM
BH CV ECHO MEAS - LVLD AP4: 6.3 CM
BH CV ECHO MEAS - LVLS AP4: 5.9 CM
BH CV ECHO MEAS - LVOT AREA (M): 2.5 CM^2
BH CV ECHO MEAS - LVOT AREA: 2.5 CM^2
BH CV ECHO MEAS - LVOT DIAM: 1.8 CM
BH CV ECHO MEAS - LVPWD: 1.5 CM
BH CV ECHO MEAS - MV A MAX VEL: 68.6 CM/SEC
BH CV ECHO MEAS - MV DEC SLOPE: 248 CM/SEC^2
BH CV ECHO MEAS - MV E MAX VEL: 63.4 CM/SEC
BH CV ECHO MEAS - MV E/A: 0.92
BH CV ECHO MEAS - RVDD: 2.5 CM
BH CV ECHO MEAS - SI(AO): 69.1 ML/M^2
BH CV ECHO MEAS - SI(CUBED): 22 ML/M^2
BH CV ECHO MEAS - SI(MOD-SP4): 5.6 ML/M^2
BH CV ECHO MEAS - SI(TEICH): 23.2 ML/M^2
BH CV ECHO MEAS - SV(AO): 120 ML
BH CV ECHO MEAS - SV(CUBED): 38.2 ML
BH CV ECHO MEAS - SV(MOD-SP4): 9.7 ML
BH CV ECHO MEAS - SV(TEICH): 40.2 ML
MAXIMAL PREDICTED HEART RATE: 145 BPM
STRESS TARGET HR: 123 BPM

## 2020-04-20 PROCEDURE — 99442 PR PHYS/QHP TELEPHONE EVALUATION 11-20 MIN: CPT | Performed by: NURSE PRACTITIONER

## 2020-04-20 NOTE — PROGRESS NOTES
Subjective     Niki Thompson is a 73 y.o. female who presents to day for Cardiomyopathy (Telephone visit for a follow up ).    CHIEF COMPLIANT  Chief Complaint   Patient presents with   • Cardiomyopathy     Telephone visit for a follow up        Active Problems:  Problem List Items Addressed This Visit        Respiratory    Shortness of breath      Other Visit Diagnoses     Positive cardiac stress test    -  Primary    Palpitations          Problem List  1)CAD s/p stenting of the LAD and RCA inD distant past   a. Follow up cath in 2013 revealed moderate LAD disease but nonobstructive otherwise  B.  Echo 4/20: EF 50 to 55%, moderate concentric left ventricular hypertrophy with grade 2 diastolic dysfunction and moderate left atrial enlargement,  C.  Stress test 4/20: Mild inferior wall ischemia, mildly depressed post stress ejection fraction 47% with mild inferior septal and inferior hypokinesis  2)Hypertension  3)Dyslipidemia  4)Hx of ischemic cardiomyopathy but recent echo showing preserved systolic fxn  4)Severe hearing loss  5.  Dementia    HPI  You have chosen to receive care through a telephone visit. Do you consent to use a telephone visit for your medical care today? Yes    Patient daughter, Demetra Anderson; is assisting her with phone visit today.  Ms. Barahona is a 73-year-old female patient who previously underwent stress test and echocardiogram.  Patient no longer complains of chest pain after increasing the isosorbide to 60 mg daily.  Patient continues to have shortness of breath even at rest.  As previously reported patient does have multiple fractures of her nose in the past which increases her difficulty breathing.  She still experiences the dizziness lightheadedness at times as well as having no energy.  She does use oxygen as needed per nasal sole cannula.  She also has palpitations when she states that her heart races on occasion but does not occur frequently.  With further discussion of her daughter  also reports hypotension and bradycardia that is intermittent and does occur on occasion.  Patient currently denies any chest pain, syncope, PND, orthopnea, or other neurological changes.            PRIOR MEDS  Current Outpatient Medications on File Prior to Visit   Medication Sig Dispense Refill   • citalopram (CeleXA) 40 MG tablet Daily.     • clopidogrel (PLAVIX) 75 MG tablet Take 1 tablet by mouth Daily. 90 tablet 3   • gabapentin (NEURONTIN) 600 MG tablet Take 600 mg by mouth 3 (Three) Times a Day.     • isosorbide mononitrate (IMDUR) 60 MG 24 hr tablet Take 1 tablet by mouth Every Morning. 30 tablet 11   • LINZESS 145 MCG capsule capsule Daily.     • lisinopril (PRINIVIL,ZESTRIL) 40 MG tablet Daily.     • memantine (NAMENDA) 10 MG tablet Daily.     • methocarbamol (ROBAXIN) 750 MG tablet Take 750 mg by mouth 3 (Three) Times a Day.     • metoprolol succinate XL (TOPROL-XL) 50 MG 24 hr tablet Take 1 tablet by mouth Daily. 30 tablet 11   • nitroglycerin (NITROSTAT) 0.4 MG SL tablet 1 under the tongue as needed for angina, may repeat q5mins for up three doses 100 tablet 11   • oxyCODONE-acetaminophen (Percocet)  MG per tablet Take 1 tablet by mouth Every 6 (Six) Hours As Needed for Moderate Pain .     • raNITIdine (ZANTAC) 150 MG tablet 2 (Two) Times a Day.     • risperiDONE (risperDAL) 0.5 MG tablet 2 (Two) Times a Day.     • sucralfate (CARAFATE) 1 g tablet Take 1 g by mouth 2 (Two) Times a Day.     • [DISCONTINUED] metoprolol tartrate (LOPRESSOR) 25 MG tablet      • [DISCONTINUED] promethazine (PHENERGAN) 25 MG tablet Take 12.5 mg by mouth 2 (Two) Times a Day As Needed for Nausea or Vomiting. Half tab       No current facility-administered medications on file prior to visit.        ALLERGIES  Azithromycin; Indomethacin; Macrolides and ketolides; Penicillins; Prevacid [lansoprazole]; Rocephin [ceftriaxone]; Sulfa antibiotics; and Tetracyclines & related    HISTORY  Past Medical History:   Diagnosis Date      • CAD (coronary artery disease), native coronary artery 2016   • Chest pain 2016   • COPD (chronic obstructive pulmonary disease) (CMS/[facility]) 2016   • Coronary artery stenosis 2016   • Dyslipidemia 2016   • Esophageal reflux 2016   • Hyperlipidemia 2016   • Hypertension 2016   • Ischemic cardiomyopathy 2016   • Osteoarthritis 2016   • Peripheral vascular disease (CMS/HCC) 2016   • STEMI (ST elevation myocardial infarction) (CMS/HCC) 2016       Social History     Socioeconomic History   • Marital status:      Spouse name: Not on file   • Number of children: Not on file   • Years of education: Not on file   • Highest education level: Not on file   Tobacco Use   • Smoking status: Former Smoker     Years: 62.00     Last attempt to quit: 2019     Years since quittin.4   • Smokeless tobacco: Never Used   Substance and Sexual Activity   • Alcohol use: No   • Drug use: No   • Sexual activity: Defer       Family History   Problem Relation Age of Onset   • Heart attack Mother    • Heart attack Father    • Heart attack Sister    • Heart attack Brother    • Diabetes Other    • Coronary artery disease Other    • Seizures Other    • Heart disease Other    • Hyperlipidemia Other    • Hypertension Other    • Stroke Other        Review of Systems   Constitutional: Positive for diaphoresis and fatigue. Negative for chills and fever.   HENT: Positive for rhinorrhea. Negative for congestion and sore throat.    Eyes: Negative.  Negative for visual disturbance.   Respiratory: Positive for shortness of breath. Negative for chest tightness.    Cardiovascular: Positive for palpitations (occasional ) and leg swelling (Occasional LE edema which resolves overnight ). Negative for chest pain.   Gastrointestinal: Positive for nausea. Negative for abdominal pain, blood in stool and vomiting.   Endocrine: Negative.  Negative for cold intolerance and heat intolerance.  "  Genitourinary: Positive for frequency. Negative for dysuria, hematuria and urgency.   Musculoskeletal: Positive for arthralgias (joints ) and back pain (low back pain ).   Skin: Negative.  Negative for rash and wound.   Allergic/Immunologic: Negative.  Negative for environmental allergies and food allergies.   Neurological: Positive for weakness and light-headedness (occasionally ). Negative for dizziness and syncope.   Hematological: Bruises/bleeds easily (bruises easily ).   Psychiatric/Behavioral: Negative.  Negative for sleep disturbance (denies waking with smothering ).       Objective     VITALS: /89 (BP Location: Left arm, Patient Position: Sitting) Comment: pt reported  Pulse 75 Comment: pt reported  Ht 162.6 cm (64\")   BMI 25.92 kg/m²     LABS:   Lab Results (most recent)     None          IMAGING:   No Images in the past 120 days found..    EXAM:  Physical Exam  Patient was evaluated via telephone due to the coronavirus pandemic.  Per auscultation over the telephone patient's breathing rate was within normal limits, speech was clear, no audible wheezing was noted.  Was able to speak in full sentences without difficulty.  Their mood was appropriate for the situation was able to answer questions appropriately.  There is no signs of apparent distress.  Procedure   Procedures       Assessment/Plan    Diagnosis Plan   1. Positive cardiac stress test     2. Shortness of breath     3. Palpitations     1.  Patient did have a positive stress test for mild inferior wall ischemia with inferior basilar and inferior wall hypokinesis.  I discussed this with the patient and her daughter and both confirmed that they would like to try medication management.  Did offer left heart catheterization for further evaluation for the positive stress test.  However medical therapy was chosen and patient has not experienced any chest pain ever since increasing the isosorbide to 60 mg daily.  Patient daughter advised " anytime the wish to change her decision for further work-up that we would be more than glad to pursue it at that time.  2.  Patient's blood pressure is well controlled on current blood pressure medication regimen.  No medication changes are warranted at this time.  Patient advised to monitor blood pressure on a daily basis and report any persistent highs or lows.  Set goal blood pressure for patient at 130/80 or below.  3.  Palpitations only occur occasion and feel that they are relatively well controlled.  Due to periods of bradycardia it was a joint decision to hold on increasing the metoprolol.  4.  Patient does have Plavix as a current medication regimen in which I will add aspirin 81 mg daily.  Daughter verbalizes understanding.  5.  Informed of signs and symptoms of ACS and advised to seek emergent treatment for any new worsening symptoms.  Patient also advised sooner follow-up as needed.  Also advised to follow-up with family doctor as needed    No follow-ups on file.    Niki was seen today for cardiomyopathy.    Diagnoses and all orders for this visit:    Positive cardiac stress test    Shortness of breath    Palpitations        Niki Thompson  reports that she quit smoking about 5 months ago. She quit after 62.00 years of use. She has never used smokeless tobacco..        Patient's Body mass index is 25.92 kg/m². BMI is within normal parameters. No follow-up required..      Advance Care Planning   ACP discussion was declined by the patient. Patient has an advance directive (not in EMR), copy requested.        This visit has been rescheduled as a phone visit to comply with patient safety concerns in accordance with CDC recommendations. Total time of discussion was 15 minutes.        MEDS ORDERED DURING VISIT:  No orders of the defined types were placed in this encounter.          This document has been electronically signed by Sal Varma Jr., APRN  April 20, 2020 12:58

## 2020-04-20 NOTE — PATIENT INSTRUCTIONS

## 2020-05-19 ENCOUNTER — TELEPHONE (OUTPATIENT)
Dept: CARDIOLOGY | Facility: CLINIC | Age: 75
End: 2020-05-19

## 2020-05-19 NOTE — TELEPHONE ENCOUNTER
"----- Message from NANDO Good sent at 5/19/2020 12:52 PM EDT -----   Aspirin is used for secondary prevention for both stroke and heart attack due to his blood thinning properties.  Plavix is also a blood thinner but it works at a different place in the clotting cascade.  To maximize medication therapy from the cardiovascular standpoint I would recommend continuing aspirin 81 mg.  However if you are having signs and symptoms of bleeding such as blood in her stool frequent nosebleeds or blood in her emesis I would consider stopping it at that point.  ----- Message -----  From: Malena Contreras  Sent: 5/19/2020   8:46 AM EDT  To: Janette Lawson LPN, NANDO Good    Pt's daughter LVM stating that pt saw Dr. Scanlon yesterday and was told to take 1/2 tab of Isosorbide and to D/C the ASA that JR just put her on. Stated \"I'm totally confused\" and requested a call back.     Per chart review, JR's OV note from 4/20/2020 states:  \"...However medical therapy was chosen and patient has not experienced any chest pain ever since increasing the isosorbide to 60 mg daily.  Patient daughter advised anytime the wish to change her decision for further work-up that we would be more than glad to pursue it at that time.\"   And   \"Patient does have Plavix as a current medication regimen in which I will add aspirin 81 mg daily.  Daughter verbalizes understanding.\"    : Have you heard anything from Dr. Scanlon regd this? How would you like to proceed?     " Double O-Z Plasty Text: The defect edges were debeveled with a #15 scalpel blade.  Given the location of the defect, shape of the defect and the proximity to free margins a Double O-Z plasty (double transposition flap) was deemed most appropriate.  Using a sterile surgical marker, the appropriate transposition flaps were drawn incorporating the defect and placing the expected incisions within the relaxed skin tension lines where possible. The area thus outlined was incised deep to adipose tissue with a #15 scalpel blade.  The skin margins were undermined to an appropriate distance in all directions utilizing iris scissors.  Hemostasis was achieved with electrocautery.  The flaps were then transposed into place, one clockwise and the other counterclockwise, and anchored with interrupted buried subcutaneous sutures.

## 2020-05-19 NOTE — TELEPHONE ENCOUNTER
Spoke with patients daughter, Demetra, stated she does not know why Dr Everett office stopped aspirin and decreased Imdur. Stated patient is not having any problems with bleeding.   Per JACKY Varma APRN patient to stay on aspirin 81 mg daily. Daughter verbalized understanding. Daughter verbalized understanding. Janette Lawson LPN

## 2020-09-25 ENCOUNTER — OFFICE VISIT (OUTPATIENT)
Dept: CARDIOLOGY | Facility: CLINIC | Age: 75
End: 2020-09-25

## 2020-09-25 VITALS
SYSTOLIC BLOOD PRESSURE: 94 MMHG | OXYGEN SATURATION: 94 % | DIASTOLIC BLOOD PRESSURE: 58 MMHG | BODY MASS INDEX: 28.67 KG/M2 | WEIGHT: 167 LBS | HEART RATE: 125 BPM

## 2020-09-25 DIAGNOSIS — I25.10 CORONARY ARTERY DISEASE INVOLVING NATIVE CORONARY ARTERY OF NATIVE HEART WITHOUT ANGINA PECTORIS: ICD-10-CM

## 2020-09-25 DIAGNOSIS — R07.9 CHEST PAIN, UNSPECIFIED TYPE: ICD-10-CM

## 2020-09-25 DIAGNOSIS — I48.0 PAROXYSMAL ATRIAL FIBRILLATION (HCC): Primary | ICD-10-CM

## 2020-09-25 DIAGNOSIS — I10 ESSENTIAL HYPERTENSION: ICD-10-CM

## 2020-09-25 DIAGNOSIS — I25.5 ISCHEMIC CARDIOMYOPATHY: ICD-10-CM

## 2020-09-25 DIAGNOSIS — R00.2 PALPITATION: ICD-10-CM

## 2020-09-25 PROCEDURE — 99214 OFFICE O/P EST MOD 30 MIN: CPT | Performed by: NURSE PRACTITIONER

## 2020-09-25 PROCEDURE — 93283 PRGRMG EVAL IMPLANTABLE DFB: CPT | Performed by: INTERNAL MEDICINE

## 2020-09-28 NOTE — TELEPHONE ENCOUNTER
Call from pharmacy requesting rx for new medication patient was started on for atrial fibrillation. Pt was given samples of Eliquis in the office, rx for the Eliquis sent to pharmacy.  ANGELA MACIAS

## 2020-09-29 ENCOUNTER — TELEPHONE (OUTPATIENT)
Dept: CARDIOLOGY | Facility: CLINIC | Age: 75
End: 2020-09-29

## 2020-09-29 NOTE — TELEPHONE ENCOUNTER
----- Message from NANDO Good sent at 9/29/2020  5:35 PM EDT -----  Regarding: FW: BRIANA  If patient has a normal blood pressure above 1 thing we can increase her metoprolol to 100 mg daily.  She is on succinate.  If her blood pressure is low please notify me and we will have to make some changes to her other medications for she can potentially tolerate the metoprolol.  ----- Message -----  From: Yodit Beasley MA  Sent: 9/29/2020  11:47 AM EDT  To: NANDO Good  Subject: FYI                                              Call from Noreen ([phone]) at [facility]. Heart rate at visit today was 117.  Patient denies any chest pain or SOA.

## 2020-09-29 NOTE — TELEPHONE ENCOUNTER
Spoke with Noreen.  Discussed provider recommendations.  Per Noreen, pt has appt tomorrow, 9/30/20, with JR Varma.  Will discuss with provider at that appt.

## 2020-09-30 ENCOUNTER — OFFICE VISIT (OUTPATIENT)
Dept: CARDIOLOGY | Facility: CLINIC | Age: 75
End: 2020-09-30

## 2020-09-30 VITALS
WEIGHT: 176 LBS | BODY MASS INDEX: 30.05 KG/M2 | OXYGEN SATURATION: 91 % | HEART RATE: 93 BPM | SYSTOLIC BLOOD PRESSURE: 98 MMHG | HEIGHT: 64 IN | DIASTOLIC BLOOD PRESSURE: 50 MMHG

## 2020-09-30 DIAGNOSIS — R06.02 SHORTNESS OF BREATH: ICD-10-CM

## 2020-09-30 DIAGNOSIS — E78.5 HYPERLIPIDEMIA, UNSPECIFIED HYPERLIPIDEMIA TYPE: ICD-10-CM

## 2020-09-30 DIAGNOSIS — I25.10 CORONARY ARTERY DISEASE INVOLVING NATIVE CORONARY ARTERY OF NATIVE HEART WITHOUT ANGINA PECTORIS: Primary | ICD-10-CM

## 2020-09-30 DIAGNOSIS — I10 ESSENTIAL HYPERTENSION: ICD-10-CM

## 2020-09-30 PROCEDURE — 93000 ELECTROCARDIOGRAM COMPLETE: CPT | Performed by: PHYSICIAN ASSISTANT

## 2020-09-30 PROCEDURE — 99213 OFFICE O/P EST LOW 20 MIN: CPT | Performed by: PHYSICIAN ASSISTANT

## 2020-09-30 RX ORDER — AMIODARONE HYDROCHLORIDE 200 MG/1
200 TABLET ORAL DAILY
Qty: 90 TABLET | Refills: 1 | Status: SHIPPED | OUTPATIENT
Start: 2020-09-30 | End: 2020-10-26 | Stop reason: SDUPTHER

## 2020-09-30 RX ORDER — AMIODARONE HYDROCHLORIDE 200 MG/1
200 TABLET ORAL 3 TIMES DAILY
Qty: 42 TABLET | Refills: 0 | Status: SHIPPED | OUTPATIENT
Start: 2020-09-30 | End: 2020-10-14

## 2020-09-30 NOTE — PROGRESS NOTES
Problem list     Subjective   Niki Thompson is a 73 y.o. female     Chief Complaint   Patient presents with   • Follow-up        Problem List  1)CAD s/p stenting of the LAD and RCA inD distant past   a. Follow up cath in 2013 revealed moderate LAD disease but nonobstructive otherwise  B.  Echo 4/20: EF 50 to 55%, moderate concentric left ventricular hypertrophy with grade 2 diastolic dysfunction and moderate left atrial enlargement,  C.  Stress test 4/20: Mild inferior wall ischemia, mildly depressed post stress ejection fraction 47% with mild inferior septal and inferior hypokinesis  2)Hypertension  3)Dyslipidemia  4)Hx of ischemic cardiomyopathy but recent echo showing preserved systolic fxn  4)Severe hearing loss  5.  Dementia  6.  Paroxysmal atrial fibrillation currently on Eliquis for anticoagulation  6.1 patient diagnosed during recent hospitalization in [facility] admitted for urinary tract infection and encephalopathy    HPI    Patient is a 73-year-old female that presents to the office for evaluation.  Patient is accompanied by daughter who aids in history.  Patient has a degree of dementia.  Last office visit stress and echo was reviewed.  Stress test abnormalities were identified.  However, family only wanted medical management.    Patient describes to me that she has no discomfort.  She does not describe any pressure.  She has shortness of breath at times.  However, she is mainly wheelchair dependent at this time.  She has no significant dyspnea at baseline.  No PND orthopnea.    She does not describe palpitations or dysrhythmic symptoms.  She does not describe any bleeding issues on Eliquis.  Otherwise patient is doing well      Current Outpatient Medications on File Prior to Visit   Medication Sig Dispense Refill   • apixaban (ELIQUIS) 5 MG tablet tablet Take 1 tablet by mouth Every 12 (Twelve) Hours. 60 tablet 11   • citalopram (CeleXA) 40 MG tablet Daily.     • clopidogrel  (PLAVIX) 75 MG tablet Take 1 tablet by mouth Daily. 90 tablet 3   • gabapentin (NEURONTIN) 600 MG tablet Take 600 mg by mouth 3 (Three) Times a Day.     • isosorbide mononitrate (IMDUR) 60 MG 24 hr tablet Take 1 tablet by mouth Every Morning. 30 tablet 11   • LINZESS 145 MCG capsule capsule Daily.     • lisinopril (PRINIVIL,ZESTRIL) 40 MG tablet Daily.     • memantine (NAMENDA) 10 MG tablet Daily.     • methocarbamol (ROBAXIN) 750 MG tablet Take 750 mg by mouth 3 (Three) Times a Day.     • metoprolol succinate XL (TOPROL-XL) 50 MG 24 hr tablet Take 1 tablet by mouth Daily. 30 tablet 11   • nitroglycerin (NITROSTAT) 0.4 MG SL tablet 1 under the tongue as needed for angina, may repeat q5mins for up three doses 100 tablet 11   • oxyCODONE-acetaminophen (Percocet)  MG per tablet Take 1 tablet by mouth Every 6 (Six) Hours As Needed for Moderate Pain .     • raNITIdine (ZANTAC) 150 MG tablet 2 (Two) Times a Day.     • risperiDONE (risperDAL) 0.5 MG tablet 2 (Two) Times a Day.     • sucralfate (CARAFATE) 1 g tablet Take 1 g by mouth 2 (Two) Times a Day.       No current facility-administered medications on file prior to visit.        Azithromycin, Indomethacin, Macrolides and ketolides, Penicillins, Prevacid [lansoprazole], Rocephin [ceftriaxone], Sulfa antibiotics, and Tetracyclines & related    Past Medical History:   Diagnosis Date   • CAD (coronary artery disease), native coronary artery 8/25/2016   • Chest pain 8/25/2016   • COPD (chronic obstructive pulmonary disease) (CMS/[facility]) 8/25/2016   • Coronary artery stenosis 8/25/2016   • Dyslipidemia 8/25/2016   • Esophageal reflux 8/25/2016   • Hyperlipidemia 8/25/2016   • Hypertension 8/25/2016   • Ischemic cardiomyopathy 8/25/2016   • Osteoarthritis 8/25/2016   • Peripheral vascular disease (CMS/[facility]) 8/25/2016   • STEMI (ST elevation myocardial infarction) (CMS/[facility]) 8/25/2016       Social History     Socioeconomic History   • Marital status:      Spouse  name: Not on file   • Number of children: Not on file   • Years of education: Not on file   • Highest education level: Not on file   Tobacco Use   • Smoking status: Former Smoker     Years: 62.00     Quit date: 2019     Years since quittin.8   • Smokeless tobacco: Never Used   Substance and Sexual Activity   • Alcohol use: No   • Drug use: No   • Sexual activity: Defer       Family History   Problem Relation Age of Onset   • Heart attack Mother    • Heart attack Father    • Heart attack Sister    • Heart attack Brother    • Diabetes Other    • Coronary artery disease Other    • Seizures Other    • Heart disease Other    • Hyperlipidemia Other    • Hypertension Other    • Stroke Other        Review of Systems   Constitutional: Positive for fatigue.   HENT: Positive for congestion and rhinorrhea. Negative for sore throat.    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   Respiratory: Positive for chest tightness and shortness of breath.    Cardiovascular: Positive for chest pain (Some CP on right side ) and leg swelling (BLE edema ). Negative for palpitations.   Gastrointestinal: Positive for constipation and nausea (occurs at random, states it seems unrelated to eating ). Negative for blood in stool, diarrhea and vomiting.   Endocrine: Positive for cold intolerance (back and forth ) and heat intolerance (back and forth ).   Genitourinary: Positive for dysuria, frequency and urgency. Negative for difficulty urinating and hematuria.        Recently had severe UTI      Musculoskeletal: Positive for arthralgias, back pain, gait problem (WC- states pt is really unbalanced ) and neck pain.   Skin: Negative.    Allergic/Immunologic: Negative.    Neurological: Negative for dizziness, syncope, weakness, light-headedness, numbness and headaches.   Hematological: Bruises/bleeds easily.   Psychiatric/Behavioral: Positive for confusion (Dementia) and sleep disturbance (Daughter states pt doesn't sleep well,  "but is unsure of SoA as she doesn't live w/ pt. ).   All other systems reviewed and are negative.      Objective   Vitals:    09/30/20 1133   BP: 98/50   Pulse: 93   SpO2: 91%   Weight: 79.8 kg (176 lb)   Height: 162.6 cm (64\")      BP 98/50   Pulse 93   Ht 162.6 cm (64\")   Wt 79.8 kg (176 lb) Comment: stated- pt in WC  SpO2 91%   BMI 30.21 kg/m²     Lab Results (most recent)     None          Physical Exam  Vitals signs and nursing note reviewed.   Constitutional:       General: She is not in acute distress.     Appearance: Normal appearance. She is well-developed.   HENT:      Head: Normocephalic and atraumatic.   Eyes:      General: No scleral icterus.        Right eye: No discharge.         Left eye: No discharge.      Conjunctiva/sclera: Conjunctivae normal.   Neck:      Vascular: No carotid bruit.   Cardiovascular:      Rate and Rhythm: Normal rate and regular rhythm.      Heart sounds: Normal heart sounds. No murmur. No friction rub. No gallop.    Pulmonary:      Effort: Pulmonary effort is normal. No respiratory distress.      Breath sounds: Normal breath sounds. No wheezing or rales.   Chest:      Chest wall: No tenderness.   Musculoskeletal:      Right lower leg: No edema.      Left lower leg: No edema.   Skin:     General: Skin is warm and dry.      Coloration: Skin is not pale.      Findings: No erythema or rash.   Neurological:      Mental Status: She is alert and oriented to person, place, and time.      Cranial Nerves: No cranial nerve deficit.   Psychiatric:         Behavior: Behavior normal.         Procedure     ECG 12 Lead    Date/Time: 9/30/2020 11:40 AM  Performed by: Davon Rios PA  Authorized by: Davon Rios PA   Comparison: compared with previous ECG from 3/18/2020  Comments: EKG demonstrate sinus rhythm at 91 bpm with nonspecific ST abnormality at baseline               Assessment/Plan     Problems Addressed this Visit        Cardiovascular and Mediastinum    Coronary " artery disease involving native coronary artery of native heart without angina pectoris - Primary    Relevant Orders    ECG 12 Lead    Hyperlipidemia    Relevant Orders    ECG 12 Lead    Hypertension    Relevant Orders    ECG 12 Lead       Respiratory    Shortness of breath    Relevant Orders    ECG 12 Lead      Diagnoses       Codes Comments    Coronary artery disease involving native coronary artery of native heart without angina pectoris    -  Primary ICD-10-CM: I25.10  ICD-9-CM: 414.01     Hyperlipidemia, unspecified hyperlipidemia type     ICD-10-CM: E78.5  ICD-9-CM: 272.4     Essential hypertension     ICD-10-CM: I10  ICD-9-CM: 401.9     Shortness of breath     ICD-10-CM: R06.02  ICD-9-CM: 786.05           Recommendation  1.  Patient with coronary artery disease in the past.  Stress test demonstrates ischemia but they only want medical therapy at this time.  Patient does not describe chest discomfort to me at this time    2.  Patient with shortness of breath at times.  She is doing well from that standpoint.  She does not describe progressive dyspnea and at baseline is doing well    3.  Patient with baseline hypertension.  Slightly hypotensive today and we will have to monitor closely.  We have had discussion in regards to atrial fibrillation.  She is on anticoagulation currently is in sinus rhythm.  We have discussed risk of falls.  They are willing to continue anticoagulation at this time    4.  Otherwise we will see her back for follow-up in 6 months or sooner symptoms discussed.  Follow-up with primary as scheduled           Niki Thompson  reports that she quit smoking about 10 months ago. She quit after 62.00 years of use. She has never used smokeless tobacco.     Patient's Body mass index is 30.21 kg/m². BMI is above normal parameters. Recommendations include: educational material.       Electronically signed by:

## 2020-09-30 NOTE — PATIENT INSTRUCTIONS
"Fat and Cholesterol Restricted Eating Plan  Getting too much fat and cholesterol in your diet may cause health problems. Choosing the right foods helps keep your fat and cholesterol at normal levels. This can keep you from getting certain diseases.  Your doctor may recommend an eating plan that includes:  · Total fat: ______% or less of total calories a day.  · Saturated fat: ______% or less of total calories a day.  · Cholesterol: less than _________mg a day.  · Fiber: ______g a day.  What are tips for following this plan?  Meal planning  · At meals, divide your plate into four equal parts:  ? Fill one-half of your plate with vegetables and green salads.  ? Fill one-fourth of your plate with whole grains.  ? Fill one-fourth of your plate with low-fat (lean) protein foods.  · Eat fish that is high in omega-3 fats at least two times a week. This includes mackerel, tuna, sardines, and salmon.  · Eat foods that are high in fiber, such as whole grains, beans, apples, broccoli, carrots, peas, and barley.  General tips    · Work with your doctor to lose weight if you need to.  · Avoid:  ? Foods with added sugar.  ? Fried foods.  ? Foods with partially hydrogenated oils.  · Limit alcohol intake to no more than 1 drink a day for nonpregnant women and 2 drinks a day for men. One drink equals 12 oz of beer, 5 oz of wine, or 1½ oz of hard liquor.  Reading food labels  · Check food labels for:  ? Trans fats.  ? Partially hydrogenated oils.  ? Saturated fat (g) in each serving.  ? Cholesterol (mg) in each serving.  ? Fiber (g) in each serving.  · Choose foods with healthy fats, such as:  ? Monounsaturated fats.  ? Polyunsaturated fats.  ? Omega-3 fats.  · Choose grain products that have whole grains. Look for the word \"whole\" as the first word in the ingredient list.  Cooking  · Cook foods using low-fat methods. These include baking, boiling, grilling, and broiling.  · Eat more home-cooked foods. Eat at restaurants and buffets " less often.  · Avoid cooking using saturated fats, such as butter, cream, palm oil, palm kernel oil, and coconut oil.  Recommended foods    Fruits  · All fresh, canned (in natural juice), or frozen fruits.  Vegetables  · Fresh or frozen vegetables (raw, steamed, roasted, or grilled). Green salads.  Grains  · Whole grains, such as whole wheat or whole grain breads, crackers, cereals, and pasta. Unsweetened oatmeal, bulgur, barley, quinoa, or brown rice. Corn or whole wheat flour tortillas.  Meats and other protein foods  · Ground beef (85% or leaner), grass-fed beef, or beef trimmed of fat. Skinless chicken or turkey. Ground chicken or turkey. Pork trimmed of fat. All fish and seafood. Egg whites. Dried beans, peas, or lentils. Unsalted nuts or seeds. Unsalted canned beans. Nut butters without added sugar or oil.  Dairy  · Low-fat or nonfat dairy products, such as skim or 1% milk, 2% or reduced-fat cheeses, low-fat and fat-free ricotta or cottage cheese, or plain low-fat and nonfat yogurt.  Fats and oils  · Tub margarine without trans fats. Light or reduced-fat mayonnaise and salad dressings. Avocado. Olive, canola, sesame, or safflower oils.  The items listed above may not be a complete list of foods and beverages you can eat. Contact a dietitian for more information.  Foods to avoid  Fruits  · Canned fruit in heavy syrup. Fruit in cream or butter sauce. Fried fruit.  Vegetables  · Vegetables cooked in cheese, cream, or butter sauce. Fried vegetables.  Grains  · White bread. White pasta. White rice. Cornbread. Bagels, pastries, and croissants. Crackers and snack foods that contain trans fat and hydrogenated oils.  Meats and other protein foods  · Fatty cuts of meat. Ribs, chicken wings, caraballo, sausage, bologna, salami, chitterlings, fatback, hot dogs, bratwurst, and packaged lunch meats. Liver and organ meats. Whole eggs and egg yolks. Chicken and turkey with skin. Fried meat.  Dairy  · Whole or 2% milk, cream,  half-and-half, and cream cheese. Whole milk cheeses. Whole-fat or sweetened yogurt. Full-fat cheeses. Nondairy creamers and whipped toppings. Processed cheese, cheese spreads, and cheese curds.  Beverages  · Alcohol. Sugar-sweetened drinks such as sodas, lemonade, and fruit drinks.  Fats and oils  · Butter, stick margarine, lard, shortening, ghee, or caraballo fat. Coconut, palm kernel, and palm oils.  Sweets and desserts  · Corn syrup, sugars, honey, and molasses. Candy. Jam and jelly. Syrup. Sweetened cereals. Cookies, pies, cakes, donuts, muffins, and ice cream.  The items listed above may not be a complete list of foods and beverages you should avoid. Contact a dietitian for more information.  Summary  · Choosing the right foods helps keep your fat and cholesterol at normal levels. This can keep you from getting certain diseases.  · At meals, fill one-half of your plate with vegetables and green salads.  · Eat high-fiber foods, like whole grains, beans, apples, carrots, peas, and barley.  · Limit added sugar, saturated fats, alcohol, and fried foods.  This information is not intended to replace advice given to you by your health care provider. Make sure you discuss any questions you have with your health care provider.  Document Released: 06/18/2013 Document Revised: 08/21/2019 Document Reviewed: 09/04/2018  [employer]vier Patient Education © 2020 [employer]  BMI for Adults    Body mass index (BMI) is a number that is calculated from a person's weight and height. BMI may help to estimate how much of a person's weight is composed of fat. BMI can help identify those who may be at higher risk for certain medical problems.  How is BMI used with adults?  BMI is used as a screening tool to identify possible weight problems. It is used to check whether a person is obese, overweight, healthy weight, or underweight.  How is BMI calculated?  BMI measures your weight and compares it to your height. This can be done either in  "English ([place] or metric measurements. Note that charts are available to help you find your BMI quickly and easily without having to do these calculations yourself.  To calculate your BMI in English ([place] measurements, your health care provider will:  1. Measure your weight in pounds (lb).  2. Multiply the number of pounds by 703.  ? For example, for a person who weighs 180 lb, multiply that number by 703, which equals 126,540.  3. Measure your height in inches (in). Then multiply that number by itself to get a measurement called \"inches squared.\"  ? For example, for a person who is 70 in tall, the \"inches squared\" measurement is 70 in x 70 in, which equals 4900 inches squared.  4. Divide the total from Step 2 (number of lb x 703) by the total from Step 3 (inches squared): 126,540 ÷ 4900 = 25.8. This is your BMI.  To calculate your BMI in metric measurements, your health care provider will:  1. Measure your weight in kilograms (kg).  2. Measure your height in meters (m). Then multiply that number by itself to get a measurement called \"meters squared.\"  ? For example, for a person who is 1.75 m tall, the \"meters squared\" measurement is 1.75 m x 1.75 m, which is equal to 3.1 meters squared.  3. Divide the number of kilograms (your weight) by the meters squared number. In this example: 70 ÷ 3.1 = 22.6. This is your BMI.  How is BMI interpreted?  To interpret your results, your health care provider will use BMI charts to identify whether you are underweight, normal weight, overweight, or obese. The following guidelines will be used:  · Underweight: BMI less than 18.5.  · Normal weight: BMI between 18.5 and 24.9.  · Overweight: BMI between 25 and 29.9.  · Obese: BMI of 30 and above.  Please note:  · Weight includes both fat and muscle, so someone with a muscular build, such as an athlete, may have a BMI that is higher than 24.9. In cases like these, BMI is not an accurate measure of body fat.  · To determine if excess " body fat is the cause of a BMI of 25 or higher, further assessments may need to be done by a health care provider.  · BMI is usually interpreted in the same way for men and women.  Why is BMI a useful tool?  BMI is useful in two ways:  · Identifying a weight problem that may be related to a medical condition, or that may increase the risk for medical problems.  · Promoting lifestyle and diet changes in order to reach a healthy weight.  Summary  · Body mass index (BMI) is a number that is calculated from a person's weight and height.  · BMI may help to estimate how much of a person's weight is composed of fat. BMI can help identify those who may be at higher risk for certain medical problems.  · BMI can be measured using English measurements or metric measurements.  · To interpret your results, your health care provider will use BMI charts to identify whether you are underweight, normal weight, overweight, or obese.  This information is not intended to replace advice given to you by your health care provider. Make sure you discuss any questions you have with your health care provider.  Document Released: 08/29/2005 Document Revised: 11/30/2018 Document Reviewed: 10/31/2018  [employer] Patient Education © 2020 [employer]  How to Quarantine at Home  Information for Patients and Families    These instructions are for people with confirmed or suspected COVID-19 who do not need to be hospitalized and those with confirmed COVID-19 who were hospitalized and discharged to care for themselves at home.    If you were tested through the Health Department  The Health Department will monitor your wellbeing.  If it is determined that you do not need to be hospitalized and can be isolated at home, you will be monitored by staff from your local or state health department.     If you were tested through a Commercial [facility]  You will need to monitor yourself and report changes in your symptoms to your doctor.  See the section below  called Monitor Your Symptoms.    Follow these steps until a healthcare provider or local or state health department says you can return to your normal activities.    Stay home except to get medical care  • Restrict activities outside your home, except for getting medical care.   • Do not go to work, school, or public areas.   • Avoid using public transportation, ride-sharing, or taxis.    Separate yourself from other people and animals in your home  People  As much as possible, you should stay in a specific room and away from other people in your home. Also, you should use a separate bathroom, if available.    Animals  You should restrict contact with pets and other animals while you are sick with COVID-19, just like you would around other people. When possible, have another member of your household care for your animals while you are sick. If you are sick with COVID-19, avoid contact with your pet, including petting, snuggling, being kissed or licked, and sharing food. If you must care for your pet or be around animals while you are sick, wash your hands before and after you interact with pets and wear a facemask. See COVID-19 and Animals for more information.    Call ahead before visiting your doctor  If you have a medical appointment, call the healthcare provider and tell them that you have or may have COVID-19. This information will help the healthcare provider’s office take steps to keep other people from getting infected or exposed.    Wear a facemask  You should wear a facemask when you are around other people (e.g., sharing a room or vehicle) or pets and before you enter a healthcare provider’s office.     If you are not able to wear a facemask (for example, because it causes trouble breathing), then people who live with you should not stay in the same room with you, or they should wear a facemask if they enter your room.    Cover your coughs and sneezes  • Cover your mouth and nose with a tissue when you  cough or sneeze.   • Throw used tissues in a lined trash can.   • Immediately wash your hands with soap and water for at least 20 seconds or, if soap and water are not available, clean your hands with an alcohol-based hand  that contains at least 60% alcohol.    Clean your hands often  • Wash your hands often with soap and water for at least 20 seconds, especially after blowing your nose, coughing, or sneezing; going to the bathroom; and before eating or preparing food.     • If soap and water are not readily available, use an alcohol-based hand  with at least 60% alcohol, covering all surfaces of your hands and rubbing them together until they feel dry.    • Soap and water are the best option if hands are visibly dirty. Avoid touching your eyes, nose, and mouth with unwashed hands.    Avoid sharing personal household items  • You should not share dishes, drinking glasses, cups, eating utensils, towels, or bedding with other people or pets in your home.   • After using these items, they should be washed thoroughly with soap and water.    Clean all “high-touch” surfaces everyday  • High touch surfaces include counters, tabletops, doorknobs, bathroom fixtures, toilets, phones, keyboards, tablets, and bedside tables.   • Also, clean any surfaces that may have blood, stool, or body fluids on them.   • Use a household cleaning spray or wipe, according to the label instructions. Labels contain instructions for safe and effective use of the cleaning product, including precautions you should take when applying the product, such as wearing gloves and making sure you have good ventilation during use of the product.    Monitor your symptoms  • Seek prompt medical attention if your illness is worsening (e.g., difficulty breathing).   • Before seeking care, call your healthcare provider and tell them that you have, or are being evaluated for, COVID-19.   • Put on a facemask before you enter the facility.      • These steps will help the healthcare provider’s office to keep other people in the office or waiting room from getting infected or exposed.   • Persons who are placed under active monitoring or facilitated self-monitoring should follow instructions provided by their local health department or occupational health professionals, as appropriate.  • If you have a medical emergency and need to call 911, notify the dispatch personnel that you have, or are being evaluated for COVID-19. If possible, put on a facemask before emergency medical services arrive.    Discontinuing home isolation  Patients with confirmed COVID-19 should remain under home isolation precautions until the risk of secondary transmission to others is thought to be low. The decision to discontinue home isolation precautions should be made on a case-by-case basis, in consultation with healthcare providers and state and local health departments.    The below content are for household members, intimate partners, and caregivers of a patient with symptomatic laboratory-confirmed COVID-19 or a patient under investigation:    Household members, intimate partners, and caregivers may have close contact with a person with symptomatic, laboratory-confirmed COVID-19 or a person under investigation.     Close contacts should monitor their health; they should call their healthcare provider right away if they develop symptoms suggestive of COVID-19 (e.g., fever, cough, shortness of breath)     Close contacts should also follow these recommendations:  • Make sure that you understand and can help the patient follow their healthcare provider’s instructions for medication(s) and care. You should help the patient with basic needs in the home and provide support for getting groceries, prescriptions, and other personal needs.  • Monitor the patient’s symptoms. If the patient is getting sicker, call his or her healthcare provider and tell them that the patient has  laboratory-confirmed COVID-19. This will help the healthcare provider’s office take steps to keep other people in the office or waiting room from getting infected. Ask the healthcare provider to call the local or state health department for additional guidance. If the patient has a medical emergency and you need to call 911, notify the dispatch personnel that the patient has, or is being evaluated for COVID-19.  • Household members should stay in another room or be  from the patient as much as possible. Household members should use a separate bedroom and bathroom, if available.  • Prohibit visitors who do not have an essential need to be in the home.  • Household members should care for any pets in the home. Do not handle pets or other animals while sick.  For more information, see COVID-19 and Animals.  • Make sure that shared spaces in the home have good air flow, such as by an air conditioner or an opened window, weather permitting.  • Perform hand hygiene frequently. Wash your hands often with soap and water for at least 20 seconds or use an alcohol-based hand  that contains 60 to 95% alcohol, covering all surfaces of your hands and rubbing them together until they feel dry. Soap and water should be used preferentially if hands are visibly dirty.  • Avoid touching your eyes, nose, and mouth with unwashed hands.  • The patient should wear a facemask when you are around other people. If the patient is not able to wear a facemask (for example, because it causes trouble breathing), you, as the caregiver, should wear a mask when you are in the same room as the patient.  • Wear a disposable facemask and gloves when you touch or have contact with the patient’s blood, stool, or body fluids, such as saliva, sputum, nasal mucus, vomit, or urine.   o Throw out disposable facemasks and gloves after using them. Do not reuse.  o When removing personal protective equipment, first remove and dispose of gloves.  Then, immediately clean your hands with soap and water or alcohol-based hand . Next, remove and dispose of facemask, and immediately clean your hands again with soap and water or alcohol-based hand .  • Avoid sharing household items with the patient. You should not share dishes, drinking glasses, cups, eating utensils, towels, bedding, or other items. After the patient uses these items, you should wash them thoroughly (see below “Wash laundry thoroughly”).  • Clean all “high-touch” surfaces, such as counters, tabletops, doorknobs, bathroom fixtures, toilets, phones, keyboards, tablets, and bedside tables, every day. Also, clean any surfaces that may have blood, stool, or body fluids on them.   o Use a household cleaning spray or wipe, according to the label instructions. Labels contain instructions for safe and effective use of the cleaning product including precautions you should take when applying the product, such as wearing gloves and making sure you have good ventilation during use of the product.  • Wash laundry thoroughly.   o Immediately remove and wash clothes or bedding that have blood, stool, or body fluids on them.  o Wear disposable gloves while handling soiled items and keep soiled items away from your body. Clean your hands (with soap and water or an alcohol-based hand ) immediately after removing your gloves.  o Read and follow directions on labels of laundry or clothing items and detergent. In general, using a normal laundry detergent according to washing machine instructions and dry thoroughly using the warmest temperatures recommended on the clothing label.  • Place all used disposable gloves, facemasks, and other contaminated items in a lined container before disposing of them with other household waste. Clean your hands (with soap and water or an alcohol-based hand ) immediately after handling these items. Soap and water should be used preferentially if hands  are visibly dirty.  • Discuss any additional questions with your state or local health department or healthcare provider.    Adapted from information provided by the Centers for Disease Control and Prevention.  For more information, visit https://www.cdc.gov/coronavirus/2019-ncov/hcp/guidance-prevent-spread.html  Advance Care Planning and Advance Directives     You make decisions on a daily basis - decisions about where you want to live, your career, your home, your life. Perhaps one of the most important decisions you face is your choice for future medical care. Take time to talk with your family and your healthcare team and start planning today.  Advance Care Planning is a process that can help you:  · Understand possible future healthcare decisions in light of your own experiences  · Reflect on those decision in light of your goals and values  · Discuss your decisions with those closest to you and the healthcare professionals that care for you  · Make a plan by creating a document that reflects your wishes    Surrogate Decision Maker  In the event of a medical emergency, which has left you unable to communicate or to make your own decisions, you would need someone to make decisions for you.  It is important to discuss your preferences for medical treatment with this person while you are in good health.     Qualities of a surrogate decision maker:  • Willing to take on this role and responsibility  • Knows what you want for future medical care  • Willing to follow your wishes even if they don't agree with them  • Able to make difficult medical decisions under stressful circumstances    Advance Directives  These are legal documents you can create that will guide your healthcare team and decision maker(s) when needed. These documents can be stored in the electronic medical record.    · Living Will - a legal document to guide your care if you have a terminal condition or a serious illness and are unable to  communicate. States vary by statute in document names/types, but most forms may include one or more of the following:        -  Directions regarding life-prolonging treatments        -  Directions regarding artificially provided nutrition/hydration        -  Choosing a healthcare decision maker        -  Direction regarding organ/tissue donation    · Durable Power of  for Healthcare - this document names an -in-fact to make medical decisions for you, but it may also allow this person to make personal and financial decisions for you. Please seek the advice of an  if you need this type of document.    **Advance Directives are not required and no one may discriminate against you if you do not sign one.    Medical Orders  Many states allow specific forms/orders signed by your physician to record your wishes for medical treatment in your current state of health. This form, signed in personal communication with your physician, addresses resuscitation and other medical interventions that you may or may not want.      For more information or to schedule a time with a [facility] Advance Care Planning Facilitator contact: [email or web address] or call [phone] and someone will contact you directly.

## 2020-10-26 ENCOUNTER — OFFICE VISIT (OUTPATIENT)
Dept: CARDIOLOGY | Facility: CLINIC | Age: 75
End: 2020-10-26

## 2020-10-26 VITALS
HEART RATE: 112 BPM | DIASTOLIC BLOOD PRESSURE: 64 MMHG | HEIGHT: 64 IN | OXYGEN SATURATION: 95 % | BODY MASS INDEX: 30.05 KG/M2 | WEIGHT: 176 LBS | SYSTOLIC BLOOD PRESSURE: 90 MMHG

## 2020-10-26 DIAGNOSIS — I48.0 PAROXYSMAL ATRIAL FIBRILLATION (HCC): ICD-10-CM

## 2020-10-26 DIAGNOSIS — R06.02 SHORTNESS OF BREATH: ICD-10-CM

## 2020-10-26 DIAGNOSIS — I25.10 CORONARY ARTERY DISEASE INVOLVING NATIVE CORONARY ARTERY OF NATIVE HEART WITHOUT ANGINA PECTORIS: Primary | ICD-10-CM

## 2020-10-26 DIAGNOSIS — I25.5 ISCHEMIC CARDIOMYOPATHY: ICD-10-CM

## 2020-10-26 PROCEDURE — 99214 OFFICE O/P EST MOD 30 MIN: CPT | Performed by: PHYSICIAN ASSISTANT

## 2020-10-26 RX ORDER — AMIODARONE HYDROCHLORIDE 200 MG/1
200 TABLET ORAL DAILY
Qty: 90 TABLET | Refills: 1 | Status: SHIPPED | OUTPATIENT
Start: 2020-10-26 | End: 2020-10-26

## 2020-10-26 RX ORDER — AMIODARONE HYDROCHLORIDE 200 MG/1
200 TABLET ORAL DAILY
Qty: 90 TABLET | Refills: 1 | Status: SHIPPED | OUTPATIENT
Start: 2020-10-26 | End: 2021-01-01

## 2020-10-26 NOTE — PROGRESS NOTES
Problem list     Subjective   Niki Thompson is a 73 y.o. female     Chief Complaint   Patient presents with   • Follow-up        Problem List  1)CAD s/p stenting of the LAD and RCA inD distant past   a. Follow up cath in 2013 revealed moderate LAD disease but nonobstructive otherwise  B.  Echo 4/20: EF 50 to 55%, moderate concentric left ventricular hypertrophy with grade 2 diastolic dysfunction and moderate left atrial enlargement,  C.  Stress test 4/20: Mild inferior wall ischemia, mildly depressed post stress ejection fraction 47% with mild inferior septal and inferior hypokinesis  2)Hypertension  3)Dyslipidemia  4)Hx of ischemic cardiomyopathy but recent echo showing preserved systolic fxn  4.1 patient is status post [employer] AICD placement in the distant past  4)Severe hearing loss  5.  Dementia  6.  Paroxysmal atrial fibrillation started on Eliquis for anticoagulation  6.1 patient diagnosed during recent hospitalization in [facility] admitted for urinary tract infection and encephalopathy      HPI    Patient is a 73-year-old female that presents to the office for evaluation    Recently, patient was in the emergency room in hospital because of respiratory distress.  According to the daughter, she was taken to the hospital because she had more shortness of breath and apparently had respiratory failure requiring intubation and mechanical ventilation.  There was evidence of pulmonary edema on chest x-ray.  There was also concern about pneumonia.  Patient had been on multiple medications including opiates and there was concern about drug toxicity by review of hospital records.  However, in regards to pulmonary edema, patient had proBNP levels of 1400 which did not seem to correlate with severe decompensated heart failure or pulmonary edema from heart failure    Patient is significantly demented so review of systems is difficult to obtain in this patient but daughter does aid in  history.  She states that she has chest discomfort but does not really clarify.  During examination, patient looks down frequently.  She has no significant shortness of breath per her report.  She does not really characterize her chest pain any further.  She does not describe PND orthopnea.    She does not necessarily describe palpitations although her heart rate is increased.  Last office visit, she was placed on amiodarone because she had frequent mode switching on AICD interrogation and apparently she had a defibrillatory shock.  Patient does not work,.  Of course she has significant of dementia.  Family has wanted her treated medically.  There was a questionable stress test in the past showing inferior ischemia but they have ordered medical therapy          Current Outpatient Medications on File Prior to Visit   Medication Sig Dispense Refill   • apixaban (ELIQUIS) 5 MG tablet tablet Take 1 tablet by mouth Every 12 (Twelve) Hours. 60 tablet 11   • citalopram (CeleXA) 40 MG tablet Daily.     • LINZESS 145 MCG capsule capsule Daily.     • lisinopril (PRINIVIL,ZESTRIL) 40 MG tablet Daily.     • memantine (NAMENDA) 10 MG tablet Daily.     • methocarbamol (ROBAXIN) 750 MG tablet Take 750 mg by mouth 3 (Three) Times a Day.     • metoprolol succinate XL (TOPROL-XL) 50 MG 24 hr tablet Take 1 tablet by mouth Daily. 30 tablet 11   • nitroglycerin (NITROSTAT) 0.4 MG SL tablet 1 under the tongue as needed for angina, may repeat q5mins for up three doses 100 tablet 11   • oxyCODONE-acetaminophen (Percocet)  MG per tablet Take 1 tablet by mouth Every 6 (Six) Hours As Needed for Moderate Pain .     • raNITIdine (ZANTAC) 150 MG tablet 2 (Two) Times a Day.     • risperiDONE (risperDAL) 0.5 MG tablet 2 (Two) Times a Day.     • sucralfate (CARAFATE) 1 g tablet Take 1 g by mouth 2 (Two) Times a Day.     • [DISCONTINUED] amiodarone (PACERONE) 200 MG tablet Take 1 tablet by mouth Daily. 90 tablet 1   • [DISCONTINUED]  clopidogrel (PLAVIX) 75 MG tablet Take 1 tablet by mouth Daily. 90 tablet 3   • [DISCONTINUED] gabapentin (NEURONTIN) 600 MG tablet Take 600 mg by mouth 3 (Three) Times a Day.     • [DISCONTINUED] isosorbide mononitrate (IMDUR) 60 MG 24 hr tablet Take 1 tablet by mouth Every Morning. 30 tablet 11     No current facility-administered medications on file prior to visit.        Azithromycin, Indomethacin, Macrolides and ketolides, Penicillins, Prevacid [lansoprazole], Rocephin [ceftriaxone], Sulfa antibiotics, and Tetracyclines & related    Past Medical History:   Diagnosis Date   • CAD (coronary artery disease), native coronary artery 2016   • Chest pain 2016   • COPD (chronic obstructive pulmonary disease) ([facility] 2016   • Coronary artery stenosis 2016   • Dyslipidemia 2016   • Esophageal reflux 2016   • Hyperlipidemia 2016   • Hypertension 2016   • Ischemic cardiomyopathy 2016   • Osteoarthritis 2016   • Peripheral vascular disease (CMS/HCC) 2016   • STEMI (ST elevation myocardial infarction) (CMS/HCC) 2016       Social History     Socioeconomic History   • Marital status:      Spouse name: Not on file   • Number of children: Not on file   • Years of education: Not on file   • Highest education level: Not on file   Tobacco Use   • Smoking status: Former Smoker     Years: 62.00     Quit date: 2019     Years since quittin.9   • Smokeless tobacco: Never Used   Substance and Sexual Activity   • Alcohol use: No   • Drug use: No   • Sexual activity: Defer       Family History   Problem Relation Age of Onset   • Heart attack Mother    • Heart attack Father    • Heart attack Sister    • Heart attack Brother    • Diabetes Other    • Coronary artery disease Other    • Seizures Other    • Heart disease Other    • Hyperlipidemia Other    • Hypertension Other    • Stroke Other        Review of Systems   Constitutional: Positive for fatigue.   HENT:  "Positive for rhinorrhea. Negative for congestion and sore throat.    Eyes: Positive for visual disturbance (Blind in L eye ).   Respiratory: Positive for shortness of breath. Negative for chest tightness.         States pt takes a double breath when she's nervous, but doesn't seem SoA      Cardiovascular: Positive for chest pain.   Endocrine: Positive for cold intolerance (Back and forth ) and heat intolerance (Back and forth).   Musculoskeletal: Positive for arthralgias, back pain, gait problem (WC) and neck pain.   Skin: Negative.    Allergic/Immunologic: Negative.    Neurological: Negative for dizziness, syncope, weakness, light-headedness, numbness and headaches.   Hematological: Bruises/bleeds easily (Both ).   Psychiatric/Behavioral: Positive for sleep disturbance.   All other systems reviewed and are negative.      Objective   Vitals:    10/26/20 1101   BP: 90/64   Pulse: 112   SpO2: 95%   Weight: 79.8 kg (176 lb)   Height: 162.6 cm (64\")      BP 90/64   Pulse 112   Ht 162.6 cm (64\")   Wt 79.8 kg (176 lb) Comment: stated, WC  SpO2 95%   BMI 30.21 kg/m²     Lab Results (most recent)     None          Physical Exam  Vitals signs and nursing note reviewed.   Constitutional:       General: She is not in acute distress.     Appearance: Normal appearance. She is well-developed.   HENT:      Head: Normocephalic and atraumatic.   Eyes:      General: No scleral icterus.        Right eye: No discharge.         Left eye: No discharge.      Conjunctiva/sclera: Conjunctivae normal.   Neck:      Vascular: No carotid bruit.   Cardiovascular:      Rate and Rhythm: Tachycardia present. Rhythm regularly irregular.      Heart sounds: Normal heart sounds. No murmur. No friction rub. No gallop.    Pulmonary:      Effort: Pulmonary effort is normal. No respiratory distress.      Breath sounds: Normal breath sounds. No wheezing or rales.   Chest:      Chest wall: No tenderness.   Musculoskeletal:      Right lower leg: No " edema.      Left lower leg: No edema.   Skin:     General: Skin is warm and dry.      Coloration: Skin is not pale.      Findings: No erythema or rash.   Neurological:      Mental Status: She is alert and oriented to person, place, and time.      Cranial Nerves: No cranial nerve deficit.   Psychiatric:         Behavior: Behavior normal.         Procedure   Procedures       Assessment/Plan     Problems Addressed this Visit        Cardiovascular and Mediastinum    Coronary artery disease involving native coronary artery of native heart without angina pectoris - Primary    Ischemic cardiomyopathy    Paroxysmal atrial fibrillation (CMS/HCC)    Relevant Medications    amiodarone (PACERONE) 200 MG tablet       Respiratory    Shortness of breath      Diagnoses       Codes Comments    Coronary artery disease involving native coronary artery of native heart without angina pectoris    -  Primary ICD-10-CM: I25.10  ICD-9-CM: 414.01     Paroxysmal atrial fibrillation (CMS/HCC)     ICD-10-CM: I48.0  ICD-9-CM: 427.31     Shortness of breath     ICD-10-CM: R06.02  ICD-9-CM: 786.05     Ischemic cardiomyopathy     ICD-10-CM: I25.5  ICD-9-CM: 414.8           Recommendation  1.  Patient with coronary artery disease but her history is questionable.  Her daughter states that she has not noticed her mother complaining of any chest discomfort although she describes it to me today.  I am concerned about her heart rate and rhythm.  She is in A. fib.  During the hospital stay, they were told, according to the daughter, that she was in rhythm and they took her off of amiodarone.  Because of her AICD therapy, would like to place her back on that medication.  Hopefully we can maintain sinus rhythm and prevent any further defibrillatory shocks.    2.  Patient with shortness of breath that is mild at this time.  She is not decompensated in regards to failure at this time.    3.  I am concerned about QTC.  I want to monitor closely.  She is on  amiodarone with Risperdal and Cymbalta.  I want her to return next week to get an EKG.  If possible, would recommend trying different medications but will defer this to primary at this time    4.  Otherwise she appears stable.  I want to repeat AICD interrogation soon to evaluate rate and rhythm further.  We will see her next week for nurse visit to get an EKG.  She is to follow with primary as scheduled.  Instructions given to family that if blood pressure is low, to hold on metoprolol.             Niki SENIOR Donna  reports that she quit smoking about 11 months ago. She quit after 62.00 years of use. She has never used smokeless tobacco.     Patient's Body mass index is 30.21 kg/m². BMI is above normal parameters. Recommendations include: educational material.     Advance Care Planning   ACP discussion was declined by the patient. Patient has an advance directive (not in EMR), copy requested.    Electronically signed by:

## 2020-11-02 ENCOUNTER — CLINICAL SUPPORT (OUTPATIENT)
Dept: CARDIOLOGY | Facility: CLINIC | Age: 75
End: 2020-11-02

## 2020-11-02 VITALS
HEART RATE: 63 BPM | BODY MASS INDEX: 30.05 KG/M2 | OXYGEN SATURATION: 94 % | WEIGHT: 176 LBS | DIASTOLIC BLOOD PRESSURE: 62 MMHG | SYSTOLIC BLOOD PRESSURE: 93 MMHG | HEIGHT: 64 IN

## 2020-11-02 DIAGNOSIS — I48.0 PAROXYSMAL ATRIAL FIBRILLATION (HCC): Primary | ICD-10-CM

## 2020-11-02 PROCEDURE — 93000 ELECTROCARDIOGRAM COMPLETE: CPT | Performed by: PHYSICIAN ASSISTANT

## 2020-11-02 PROCEDURE — 93283 PRGRMG EVAL IMPLANTABLE DFB: CPT | Performed by: PHYSICIAN ASSISTANT

## 2020-11-02 NOTE — PROGRESS NOTES
"Niki Thompson  12/14/1946 11/2/2020   ?   Chief Complaint   Patient presents with   • Nurse visit      ?   HPI:   ? Pt in office for pacer check and the front office staff realized that pt was also in need of EKG/nurse visit. Confirmed w/ Davon that he does want an EKG/ Nurse visit today.   Per chart review, OV note from 10/26/2020 states:  \"Otherwise she appears stable.  I want to repeat AICD interrogation soon to evaluate rate and rhythm further.  We will see her next week for nurse visit to get an EKG.  She is to follow with primary as scheduled.  Instructions given to family that if blood pressure is low, to hold on metoprolol.\" Pt was started on Amiodarone.   Pt had pacer checked by Carmela mancilla calling her back for nurse visit.   ?     Current Outpatient Medications:   •  amiodarone (PACERONE) 200 MG tablet, Take 1 tablet by mouth Daily., Disp: 90 tablet, Rfl: 1  •  apixaban (ELIQUIS) 5 MG tablet tablet, Take 1 tablet by mouth Every 12 (Twelve) Hours., Disp: 60 tablet, Rfl: 11  •  citalopram (CeleXA) 40 MG tablet, Daily., Disp: , Rfl:   •  LINZESS 145 MCG capsule capsule, Daily., Disp: , Rfl:   •  lisinopril (PRINIVIL,ZESTRIL) 40 MG tablet, Daily., Disp: , Rfl:   •  memantine (NAMENDA) 10 MG tablet, Daily., Disp: , Rfl:   •  methocarbamol (ROBAXIN) 750 MG tablet, Take 750 mg by mouth 3 (Three) Times a Day., Disp: , Rfl:   •  metoprolol succinate XL (TOPROL-XL) 50 MG 24 hr tablet, Take 1 tablet by mouth Daily., Disp: 30 tablet, Rfl: 11  •  nitroglycerin (NITROSTAT) 0.4 MG SL tablet, 1 under the tongue as needed for angina, may repeat q5mins for up three doses, Disp: 100 tablet, Rfl: 11  •  oxyCODONE-acetaminophen (Percocet)  MG per tablet, Take 1 tablet by mouth Every 6 (Six) Hours As Needed for Moderate Pain ., Disp: , Rfl:   •  raNITIdine (ZANTAC) 150 MG tablet, 2 (Two) Times a Day., Disp: , Rfl:   •  risperiDONE (risperDAL) 0.5 MG tablet, 2 (Two) Times a Day., Disp: , Rfl:   •  sucralfate " (CARAFATE) 1 g tablet, Take 1 g by mouth 2 (Two) Times a Day., Disp: , Rfl:    ?   ?   Azithromycin, Indomethacin, Macrolides and ketolides, Penicillins, Prevacid [lansoprazole], Rocephin [ceftriaxone], Sulfa antibiotics, and Tetracyclines & related         ECG 12 Lead    Date/Time: 11/2/2020 2:07 PM  Performed by: Davon Rios PA  Authorized by: Davon Rios PA   Comparison: compared with previous ECG from 9/30/2020               ?   Assessment/Plan    ?   ? 1. AFiB    Confirmed that pt is taking Amiodarone 200mg daily.  Stated that she does hold Metoprolol if pt's BP is low. Pt's daughter stated that she has not heard any complaints of any other issues: palps, CP, etc.     Performed EKG.     Per Davon: Continue as is, hold Metoprolol is BP is low. Keep f/U appt.       Informed pt and her daughter, they verbalized understanding.   -ANGELA Lewis       ?

## 2021-01-01 ENCOUNTER — OFFICE VISIT (OUTPATIENT)
Dept: CARDIOLOGY | Facility: CLINIC | Age: 76
End: 2021-01-01

## 2021-01-01 ENCOUNTER — TELEPHONE (OUTPATIENT)
Dept: CARDIOLOGY | Facility: CLINIC | Age: 76
End: 2021-01-01

## 2021-01-01 ENCOUNTER — LAB (OUTPATIENT)
Dept: LAB | Facility: HOSPITAL | Age: 76
End: 2021-01-01

## 2021-01-01 ENCOUNTER — LAB (OUTPATIENT)
Dept: CARDIOLOGY | Facility: CLINIC | Age: 76
End: 2021-01-01

## 2021-01-01 ENCOUNTER — HOSPITAL ENCOUNTER (OUTPATIENT)
Dept: CARDIOLOGY | Facility: HOSPITAL | Age: 76
Discharge: HOME | End: 2021-07-01
Admitting: PHYSICIAN ASSISTANT

## 2021-01-01 VITALS
HEART RATE: 94 BPM | BODY MASS INDEX: 37.76 KG/M2 | SYSTOLIC BLOOD PRESSURE: 126 MMHG | OXYGEN SATURATION: 90 % | WEIGHT: 220 LBS | DIASTOLIC BLOOD PRESSURE: 74 MMHG

## 2021-01-01 VITALS
WEIGHT: 179 LBS | SYSTOLIC BLOOD PRESSURE: 133 MMHG | BODY MASS INDEX: 30.56 KG/M2 | HEIGHT: 64 IN | HEART RATE: 102 BPM | OXYGEN SATURATION: 91 % | DIASTOLIC BLOOD PRESSURE: 78 MMHG

## 2021-01-01 VITALS
DIASTOLIC BLOOD PRESSURE: 66 MMHG | HEART RATE: 77 BPM | OXYGEN SATURATION: 92 % | SYSTOLIC BLOOD PRESSURE: 96 MMHG | HEIGHT: 64 IN | BODY MASS INDEX: 34.83 KG/M2 | WEIGHT: 204 LBS

## 2021-01-01 VITALS
BODY MASS INDEX: 29.53 KG/M2 | WEIGHT: 173 LBS | OXYGEN SATURATION: 91 % | HEIGHT: 64 IN | SYSTOLIC BLOOD PRESSURE: 99 MMHG | HEART RATE: 104 BPM | DIASTOLIC BLOOD PRESSURE: 56 MMHG

## 2021-01-01 VITALS
HEART RATE: 84 BPM | OXYGEN SATURATION: 86 % | DIASTOLIC BLOOD PRESSURE: 73 MMHG | WEIGHT: 200 LBS | HEIGHT: 64 IN | SYSTOLIC BLOOD PRESSURE: 141 MMHG | BODY MASS INDEX: 34.15 KG/M2

## 2021-01-01 DIAGNOSIS — I25.10 CORONARY ARTERY DISEASE INVOLVING NATIVE CORONARY ARTERY OF NATIVE HEART WITHOUT ANGINA PECTORIS: ICD-10-CM

## 2021-01-01 DIAGNOSIS — R60.0 LOWER EXTREMITY EDEMA: ICD-10-CM

## 2021-01-01 DIAGNOSIS — R07.9 CHEST PAIN, UNSPECIFIED TYPE: ICD-10-CM

## 2021-01-01 DIAGNOSIS — E78.5 HYPERLIPIDEMIA, UNSPECIFIED HYPERLIPIDEMIA TYPE: ICD-10-CM

## 2021-01-01 DIAGNOSIS — I48.0 PAROXYSMAL ATRIAL FIBRILLATION (HCC): ICD-10-CM

## 2021-01-01 DIAGNOSIS — R07.9 CHEST PAIN, UNSPECIFIED TYPE: Primary | ICD-10-CM

## 2021-01-01 DIAGNOSIS — I71.40 ABDOMINAL AORTIC ANEURYSM (AAA) WITHOUT RUPTURE (HCC): Primary | ICD-10-CM

## 2021-01-01 DIAGNOSIS — R06.02 SHORTNESS OF BREATH: ICD-10-CM

## 2021-01-01 DIAGNOSIS — E78.5 DYSLIPIDEMIA: ICD-10-CM

## 2021-01-01 DIAGNOSIS — I71.40 ABDOMINAL AORTIC ANEURYSM (AAA) WITHOUT RUPTURE (HCC): ICD-10-CM

## 2021-01-01 DIAGNOSIS — G30.8 ALZHEIMER'S DEMENTIA OF OTHER ONSET WITH BEHAVIORAL DISTURBANCE: ICD-10-CM

## 2021-01-01 DIAGNOSIS — I10 ESSENTIAL HYPERTENSION: ICD-10-CM

## 2021-01-01 DIAGNOSIS — I25.10 CORONARY ARTERY DISEASE INVOLVING NATIVE CORONARY ARTERY OF NATIVE HEART WITHOUT ANGINA PECTORIS: Primary | ICD-10-CM

## 2021-01-01 DIAGNOSIS — R06.02 SHORTNESS OF BREATH: Primary | ICD-10-CM

## 2021-01-01 DIAGNOSIS — I48.0 PAROXYSMAL ATRIAL FIBRILLATION (HCC): Primary | ICD-10-CM

## 2021-01-01 DIAGNOSIS — F02.818 ALZHEIMER'S DEMENTIA OF OTHER ONSET WITH BEHAVIORAL DISTURBANCE: ICD-10-CM

## 2021-01-01 DIAGNOSIS — I10 PRIMARY HYPERTENSION: ICD-10-CM

## 2021-01-01 DIAGNOSIS — Z01.812 ENCOUNTER FOR PREPROCEDURAL LABORATORY EXAMINATION: Primary | ICD-10-CM

## 2021-01-01 DIAGNOSIS — Z74.09 IMPAIRMENT OF MOBILITY ASSOCIATED WITH IMPAIRED VISION: ICD-10-CM

## 2021-01-01 DIAGNOSIS — I25.10 CORONARY ARTERY STENOSIS: ICD-10-CM

## 2021-01-01 DIAGNOSIS — R07.2 PRECORDIAL PAIN: ICD-10-CM

## 2021-01-01 DIAGNOSIS — H54.7 IMPAIRMENT OF MOBILITY ASSOCIATED WITH IMPAIRED VISION: ICD-10-CM

## 2021-01-01 DIAGNOSIS — R00.2 PALPITATION: ICD-10-CM

## 2021-01-01 LAB
ABDOMINAL DIST AORTA AP: 2.7 CM
ABDOMINAL DIST AORTA TRANS: 2.7 CM
ABDOMINAL DIST AORTA VEL: 82 CM/S
ABDOMINAL LT COM ILIAC AP: 0.9 CM
ABDOMINAL LT COM ILIAC TRANS: 0.9 CM
ABDOMINAL LT COM ILIAC VEL: 65 CM/S
ABDOMINAL MID AORTA AP: 2.3 CM
ABDOMINAL MID AORTA TRANS: 2.4 CM
ABDOMINAL MID AORTA VEL: 59 CM/S
ABDOMINAL PROX AORTA AP: 3.1 CM
ABDOMINAL PROX AORTA TRANS: 2.9 CM
ABDOMINAL RT COM ILIAC AP: 0.9 CM
ABDOMINAL RT COM ILIAC TRANS: 0.9 CM
ABDOMINAL RT COM ILIAC VEL: 78 CM/S
ALBUMIN SERPL-MCNC: 4.19 G/DL (ref 3.5–5.2)
ALBUMIN/GLOB SERPL: 1.4 G/DL
ALP SERPL-CCNC: 83 U/L (ref 39–117)
ALT SERPL W P-5'-P-CCNC: 14 U/L (ref 1–33)
ANION GAP SERPL CALCULATED.3IONS-SCNC: 11.7 MMOL/L (ref 5–15)
ANION GAP SERPL CALCULATED.3IONS-SCNC: 9.5 MMOL/L (ref 5–15)
AST SERPL-CCNC: 19 U/L (ref 1–32)
BASOPHILS # BLD AUTO: 0.02 10*3/MM3 (ref 0–0.2)
BASOPHILS NFR BLD AUTO: 0.3 % (ref 0–1.5)
BH CV ECHO MEAS - BSA(HAYCOCK): 1.9 M^2
BH CV ECHO MEAS - BSA: 1.8 M^2
BH CV ECHO MEAS - BZI_BMI: 29.7 KILOGRAMS/M^2
BH CV ECHO MEAS - BZI_METRIC_HEIGHT: 162.6 CM
BH CV ECHO MEAS - BZI_METRIC_WEIGHT: 78.5 KG
BH CV ECHO MEAS - DIST AO DIAM: 2.7 CM
BH CV VAS SMA 1ST PP TIME: 15 MIN
BH CV VAS SMA 2ND PP TIME: 30 MIN
BH CV VAS SMA 3RD PP TIME: 45 MIN
BILIRUB SERPL-MCNC: 0.2 MG/DL (ref 0–1.2)
BUN SERPL-MCNC: 16 MG/DL (ref 8–23)
BUN SERPL-MCNC: 20 MG/DL (ref 8–23)
BUN/CREAT SERPL: 13.9 (ref 7–25)
BUN/CREAT SERPL: 16 (ref 7–25)
CALCIUM SPEC-SCNC: 9.1 MG/DL (ref 8.6–10.5)
CALCIUM SPEC-SCNC: 9.1 MG/DL (ref 8.6–10.5)
CHLORIDE SERPL-SCNC: 101 MMOL/L (ref 98–107)
CHLORIDE SERPL-SCNC: 102 MMOL/L (ref 98–107)
CO2 SERPL-SCNC: 25.3 MMOL/L (ref 22–29)
CO2 SERPL-SCNC: 26.5 MMOL/L (ref 22–29)
CREAT SERPL-MCNC: 1.15 MG/DL (ref 0.57–1)
CREAT SERPL-MCNC: 1.25 MG/DL (ref 0.57–1)
DEPRECATED RDW RBC AUTO: 51.6 FL (ref 37–54)
EOSINOPHIL # BLD AUTO: 0.2 10*3/MM3 (ref 0–0.4)
EOSINOPHIL NFR BLD AUTO: 3.4 % (ref 0.3–6.2)
ERYTHROCYTE [DISTWIDTH] IN BLOOD BY AUTOMATED COUNT: 15.3 % (ref 12.3–15.4)
GFR SERPL CREATININE-BSD FRML MDRD: 42 ML/MIN/1.73
GFR SERPL CREATININE-BSD FRML MDRD: 46 ML/MIN/1.73
GLOBULIN UR ELPH-MCNC: 3 GM/DL
GLUCOSE SERPL-MCNC: 153 MG/DL (ref 65–99)
GLUCOSE SERPL-MCNC: 153 MG/DL (ref 65–99)
HCT VFR BLD AUTO: 37.6 % (ref 34–46.6)
HGB BLD-MCNC: 11.3 G/DL (ref 12–15.9)
IMM GRANULOCYTES # BLD AUTO: 0.04 10*3/MM3 (ref 0–0.05)
IMM GRANULOCYTES NFR BLD AUTO: 0.7 % (ref 0–0.5)
LYMPHOCYTES # BLD AUTO: 1.51 10*3/MM3 (ref 0.7–3.1)
LYMPHOCYTES NFR BLD AUTO: 25.9 % (ref 19.6–45.3)
MAGNESIUM SERPL-MCNC: 2.1 MG/DL (ref 1.6–2.4)
MAXIMAL PREDICTED HEART RATE: 143 BPM
MCH RBC QN AUTO: 28 PG (ref 26.6–33)
MCHC RBC AUTO-ENTMCNC: 30.1 G/DL (ref 31.5–35.7)
MCV RBC AUTO: 93.1 FL (ref 79–97)
MONOCYTES # BLD AUTO: 0.42 10*3/MM3 (ref 0.1–0.9)
MONOCYTES NFR BLD AUTO: 7.2 % (ref 5–12)
NEUTROPHILS NFR BLD AUTO: 3.63 10*3/MM3 (ref 1.7–7)
NEUTROPHILS NFR BLD AUTO: 62.5 % (ref 42.7–76)
NRBC BLD AUTO-RTO: 0 /100 WBC (ref 0–0.2)
NT-PROBNP SERPL-MCNC: 284.1 PG/ML (ref 0–1800)
PLATELET # BLD AUTO: 177 10*3/MM3 (ref 140–450)
PMV BLD AUTO: 11.6 FL (ref 6–12)
POTASSIUM SERPL-SCNC: 4 MMOL/L (ref 3.5–5.2)
POTASSIUM SERPL-SCNC: 4.4 MMOL/L (ref 3.5–5.2)
PROT SERPL-MCNC: 7.2 G/DL (ref 6–8.5)
RBC # BLD AUTO: 4.04 10*6/MM3 (ref 3.77–5.28)
SODIUM SERPL-SCNC: 137 MMOL/L (ref 136–145)
SODIUM SERPL-SCNC: 139 MMOL/L (ref 136–145)
STRESS TARGET HR: 122 BPM
TSH SERPL DL<=0.05 MIU/L-ACNC: 2.59 UIU/ML (ref 0.27–4.2)
WBC NRBC COR # BLD: 5.82 10*3/MM3 (ref 3.4–10.8)

## 2021-01-01 PROCEDURE — 99213 OFFICE O/P EST LOW 20 MIN: CPT | Performed by: PHYSICIAN ASSISTANT

## 2021-01-01 PROCEDURE — 99214 OFFICE O/P EST MOD 30 MIN: CPT | Performed by: PHYSICIAN ASSISTANT

## 2021-01-01 PROCEDURE — 36415 COLL VENOUS BLD VENIPUNCTURE: CPT

## 2021-01-01 PROCEDURE — 80053 COMPREHEN METABOLIC PANEL: CPT | Performed by: NURSE PRACTITIONER

## 2021-01-01 PROCEDURE — 83880 ASSAY OF NATRIURETIC PEPTIDE: CPT | Performed by: NURSE PRACTITIONER

## 2021-01-01 PROCEDURE — 85025 COMPLETE CBC W/AUTO DIFF WBC: CPT | Performed by: NURSE PRACTITIONER

## 2021-01-01 PROCEDURE — 93979 VASCULAR STUDY: CPT

## 2021-01-01 PROCEDURE — 83735 ASSAY OF MAGNESIUM: CPT | Performed by: NURSE PRACTITIONER

## 2021-01-01 PROCEDURE — 99214 OFFICE O/P EST MOD 30 MIN: CPT | Performed by: NURSE PRACTITIONER

## 2021-01-01 PROCEDURE — 80048 BASIC METABOLIC PNL TOTAL CA: CPT

## 2021-01-01 PROCEDURE — 84443 ASSAY THYROID STIM HORMONE: CPT | Performed by: NURSE PRACTITIONER

## 2021-01-01 PROCEDURE — 93979 VASCULAR STUDY: CPT | Performed by: INTERNAL MEDICINE

## 2021-01-01 RX ORDER — GLIMEPIRIDE 2 MG/1
2 TABLET ORAL
COMMUNITY

## 2021-01-01 RX ORDER — FUROSEMIDE 20 MG/1
20 TABLET ORAL DAILY
Qty: 30 TABLET | Refills: 11 | Status: SHIPPED | OUTPATIENT
Start: 2021-01-01 | End: 2021-01-01

## 2021-01-01 RX ORDER — CLOPIDOGREL BISULFATE 75 MG/1
TABLET ORAL
Qty: 30 TABLET | Refills: 3 | OUTPATIENT
Start: 2021-01-01

## 2021-01-01 RX ORDER — RANOLAZINE 1000 MG/1
TABLET, EXTENDED RELEASE ORAL
Qty: 60 TABLET | Refills: 5 | OUTPATIENT
Start: 2021-01-01

## 2021-01-01 RX ORDER — ATORVASTATIN CALCIUM 20 MG/1
20 TABLET, FILM COATED ORAL DAILY
Qty: 30 TABLET | Refills: 11 | Status: SHIPPED | OUTPATIENT
Start: 2021-01-01

## 2021-01-01 RX ORDER — POTASSIUM CHLORIDE 1.5 G/1.77G
20 POWDER, FOR SOLUTION ORAL 2 TIMES DAILY
COMMUNITY

## 2021-01-01 RX ORDER — AMIODARONE HYDROCHLORIDE 200 MG/1
TABLET ORAL
Qty: 30 TABLET | Refills: 1 | Status: SHIPPED | OUTPATIENT
Start: 2021-01-01 | End: 2021-01-01

## 2021-01-01 RX ORDER — ISOSORBIDE MONONITRATE 60 MG/1
TABLET, EXTENDED RELEASE ORAL
Qty: 30 TABLET | Refills: 1 | Status: SHIPPED | OUTPATIENT
Start: 2021-01-01 | End: 2021-01-01

## 2021-01-01 RX ORDER — POTASSIUM CHLORIDE 20 MEQ/1
TABLET, EXTENDED RELEASE ORAL
Qty: 30 TABLET | Refills: 4 | Status: SHIPPED | OUTPATIENT
Start: 2021-01-01

## 2021-01-01 RX ORDER — FAMOTIDINE 40 MG/1
40 TABLET, FILM COATED ORAL DAILY
Qty: 90 TABLET | Refills: 3 | Status: SHIPPED | OUTPATIENT
Start: 2021-01-01

## 2021-01-01 RX ORDER — CLOPIDOGREL BISULFATE 75 MG/1
75 TABLET ORAL DAILY
COMMUNITY
End: 2021-01-01

## 2021-01-01 RX ORDER — LUBIPROSTONE 24 UG/1
24 CAPSULE ORAL 2 TIMES DAILY WITH MEALS
COMMUNITY

## 2021-01-01 RX ORDER — RANOLAZINE 1000 MG/1
1000 TABLET, EXTENDED RELEASE ORAL EVERY 12 HOURS SCHEDULED
Qty: 60 TABLET | Refills: 5 | Status: SHIPPED | OUTPATIENT
Start: 2021-01-01 | End: 2021-01-01

## 2021-01-01 RX ORDER — ISOSORBIDE MONONITRATE 60 MG/1
TABLET, EXTENDED RELEASE ORAL
Qty: 30 TABLET | Refills: 1 | Status: SHIPPED | OUTPATIENT
Start: 2021-01-01

## 2021-01-01 RX ORDER — DOCUSATE SODIUM 250 MG
250 CAPSULE ORAL DAILY
COMMUNITY
End: 2021-01-01

## 2021-01-01 RX ORDER — PHENAZOPYRIDINE HYDROCHLORIDE 200 MG/1
200 TABLET, FILM COATED ORAL 3 TIMES DAILY PRN
COMMUNITY
End: 2021-01-01

## 2021-01-01 RX ORDER — CLOPIDOGREL BISULFATE 75 MG/1
75 TABLET ORAL DAILY
COMMUNITY

## 2021-01-01 RX ORDER — FUROSEMIDE 40 MG/1
40 TABLET ORAL 2 TIMES DAILY
Qty: 60 TABLET | Refills: 6 | Status: SHIPPED | OUTPATIENT
Start: 2021-01-01

## 2021-01-01 RX ORDER — POTASSIUM CHLORIDE 20 MEQ/1
TABLET, EXTENDED RELEASE ORAL
Qty: 30 TABLET | Refills: 4 | Status: SHIPPED | OUTPATIENT
Start: 2021-01-01 | End: 2021-01-01

## 2021-01-01 RX ORDER — METOPROLOL SUCCINATE 50 MG/1
TABLET, EXTENDED RELEASE ORAL
Qty: 30 TABLET | Refills: 11 | Status: SHIPPED | OUTPATIENT
Start: 2021-01-01

## 2021-01-01 RX ORDER — ISOSORBIDE MONONITRATE 60 MG/1
60 TABLET, EXTENDED RELEASE ORAL DAILY
COMMUNITY
End: 2021-01-01

## 2021-01-01 RX ORDER — CLOPIDOGREL BISULFATE 75 MG/1
TABLET ORAL
Qty: 90 TABLET | Refills: 3 | Status: SHIPPED | OUTPATIENT
Start: 2021-01-01 | End: 2021-01-01

## 2021-01-01 RX ORDER — GABAPENTIN 600 MG/1
300 TABLET ORAL AS NEEDED
COMMUNITY
End: 2021-01-01

## 2021-01-01 RX ORDER — ASPIRIN 81 MG/1
81 TABLET ORAL DAILY
COMMUNITY
End: 2021-01-01 | Stop reason: SDDI

## 2021-01-01 RX ORDER — FAMOTIDINE 40 MG/1
40 TABLET, FILM COATED ORAL DAILY
COMMUNITY
End: 2021-01-01 | Stop reason: SDUPTHER

## 2021-01-01 RX ORDER — CLORAZEPATE DIPOTASSIUM 3.75 MG/1
3.75 TABLET ORAL AS NEEDED
COMMUNITY

## 2021-01-01 RX ORDER — FUROSEMIDE 40 MG/1
40 TABLET ORAL DAILY
Qty: 30 TABLET | Refills: 11 | Status: SHIPPED | OUTPATIENT
Start: 2021-01-01 | End: 2021-01-01 | Stop reason: SDUPTHER

## 2021-01-01 RX ORDER — LEVOTHYROXINE SODIUM 0.05 MG/1
50 TABLET ORAL DAILY
COMMUNITY

## 2021-03-30 PROBLEM — R60.0 LOWER EXTREMITY EDEMA: Status: ACTIVE | Noted: 2021-01-01

## 2021-03-30 NOTE — PROGRESS NOTES
Problem list     Subjective   Niki Thompson is a 74 y.o. female     Chief Complaint   Patient presents with   • Atrial Fibrillation     presents for 6 month f/u   • Shortness of Breath   • Hypertension   • Coronary Artery Disease   Problem List  1)CAD s/p stenting of the LAD and RCA inD distant past   a. Follow up cath in 2013 revealed moderate LAD disease but nonobstructive otherwise  B.  Echo 4/20: EF 50 to 55%, moderate concentric left ventricular hypertrophy with grade 2 diastolic dysfunction and moderate left atrial enlargement,  C.  Stress test 4/20: Mild inferior wall ischemia, mildly depressed post stress ejection fraction 47% with mild inferior septal and inferior hypokinesis  2)Hypertension  3)Dyslipidemia  4)Hx of ischemic cardiomyopathy but recent echo showing preserved systolic fxn  4.1 patient is status post [employer] AICD placement in the distant past  4)Severe hearing loss  5.  Dementia  6.  Paroxysmal atrial fibrillation started on Eliquis for anticoagulation  6.1 patient diagnosed during recent hospitalization in [facility] admitted for urinary tract infection and encephalopathy    HPI    Patient is a 74-year-old female that presents to the office for follow-up.  Patient is accompanied by her grandson.  Patient has a degree of dementia and grandson helps aid in her care    History is very limited.  She has been complaining of some left-sided discomfort but does not really characterize it any further.  She has been short of breath and been mildly edematous in the lower extremities.  Beyond that, no further history from the patient was obtained.  Grandson was concerned about her complaints of discomfort and edema.  They present today for further evaluation      Current Outpatient Medications on File Prior to Visit   Medication Sig Dispense Refill   • amiodarone (PACERONE) 200 MG tablet Take 1 tablet by mouth Daily. 90 tablet 1   • apixaban (ELIQUIS) 5 MG tablet  tablet Take 1 tablet by mouth Every 12 (Twelve) Hours. 60 tablet 11   • citalopram (CeleXA) 40 MG tablet Daily.     • clopidogrel (PLAVIX) 75 MG tablet Take 75 mg by mouth Daily.     • LINZESS 145 MCG capsule capsule Daily.     • lisinopril (PRINIVIL,ZESTRIL) 40 MG tablet Daily.     • memantine (NAMENDA) 10 MG tablet Daily.     • methocarbamol (ROBAXIN) 750 MG tablet Take 750 mg by mouth 3 (Three) Times a Day.     • metoprolol succinate XL (TOPROL-XL) 50 MG 24 hr tablet TAKE ONE TABLET BY MOUTH EVERY DAY FOR HEART AND BLOOD PRESSURE 30 tablet 11   • nitroglycerin (NITROSTAT) 0.4 MG SL tablet 1 under the tongue as needed for angina, may repeat q5mins for up three doses 100 tablet 11   • oxyCODONE-acetaminophen (Percocet)  MG per tablet Take 1 tablet by mouth Every 6 (Six) Hours As Needed for Moderate Pain .     • risperiDONE (risperDAL) 0.5 MG tablet 2 (Two) Times a Day.     • sucralfate (CARAFATE) 1 g tablet Take 1 g by mouth 2 (Two) Times a Day.     • [DISCONTINUED] raNITIdine (ZANTAC) 150 MG tablet 2 (Two) Times a Day.       No current facility-administered medications on file prior to visit.       Azithromycin, Indomethacin, Macrolides and ketolides, Penicillins, Prevacid [lansoprazole], Rocephin [ceftriaxone], Sulfa antibiotics, and Tetracyclines & related    Past Medical History:   Diagnosis Date   • CAD (coronary artery disease), native coronary artery 8/25/2016   • Chest pain 8/25/2016   • COPD (chronic obstructive pulmonary disease) (CMS/[facility]) 8/25/2016   • Coronary artery stenosis 8/25/2016   • Dyslipidemia 8/25/2016   • Esophageal reflux 8/25/2016   • Hyperlipidemia 8/25/2016   • Hypertension 8/25/2016   • Ischemic cardiomyopathy 8/25/2016   • Osteoarthritis 8/25/2016   • Peripheral vascular disease (CMS/[facility]) 8/25/2016   • STEMI (ST elevation myocardial infarction) (CMS/[facility]) 8/25/2016       Social History     Socioeconomic History   • Marital status:      Spouse name: Not on file   • Number of  "children: Not on file   • Years of education: Not on file   • Highest education level: Not on file   Tobacco Use   • Smoking status: Former Smoker     Years: 62.00     Quit date: 2019     Years since quittin.3   • Smokeless tobacco: Never Used   Substance and Sexual Activity   • Alcohol use: No   • Drug use: No   • Sexual activity: Defer       Family History   Problem Relation Age of Onset   • Heart attack Mother    • Heart attack Father    • Heart attack Sister    • Heart attack Brother    • Diabetes Other    • Coronary artery disease Other    • Seizures Other    • Heart disease Other    • Hyperlipidemia Other    • Hypertension Other    • Stroke Other        Review of Systems   Constitutional: Positive for diaphoresis and fatigue. Negative for chills and fever.   HENT: Negative.    Respiratory: Positive for apnea (02), shortness of breath (with any activity and times at rest) and wheezing. Negative for cough and chest tightness.    Cardiovascular: Positive for chest pain and leg swelling. Negative for palpitations.   Gastrointestinal: Positive for constipation.   Endocrine: Negative.    Genitourinary: Negative.    Musculoskeletal: Positive for arthralgias, back pain and gait problem (in w/c).   Skin: Negative.    Allergic/Immunologic: Negative.  Negative for environmental allergies and food allergies.   Neurological: Positive for dizziness (with quick movement) and weakness. Negative for syncope, light-headedness, numbness and headaches.   Hematological: Bruises/bleeds easily.   Psychiatric/Behavioral: Positive for sleep disturbance (toss and turn). Negative for agitation. The patient is not nervous/anxious.        Objective   Vitals:    21 1258   BP: 96/66   BP Location: Left arm   Patient Position: Sitting   Pulse: 77   SpO2: 92%   Weight: 92.5 kg (204 lb)   Height: 162.6 cm (64.02\")      BP 96/66 (BP Location: Left arm, Patient Position: Sitting)   Pulse 77   Ht 162.6 cm (64.02\")   Wt 92.5 kg " (204 lb) Comment: weighed in wheelchair  SpO2 92%   BMI 35.00 kg/m²     Lab Results (most recent)     None          Physical Exam  Vitals and nursing note reviewed.   Constitutional:       General: She is not in acute distress.     Appearance: Normal appearance. She is well-developed.   HENT:      Head: Normocephalic and atraumatic.   Eyes:      General: No scleral icterus.        Right eye: No discharge.         Left eye: No discharge.      Conjunctiva/sclera: Conjunctivae normal.   Neck:      Vascular: No carotid bruit.   Cardiovascular:      Rate and Rhythm: Normal rate and regular rhythm.      Heart sounds: Normal heart sounds. No murmur heard.   No friction rub. No gallop.    Pulmonary:      Effort: Pulmonary effort is normal. No respiratory distress.      Breath sounds: Normal breath sounds. No wheezing or rales.   Chest:      Chest wall: No tenderness.   Musculoskeletal:      Right lower leg: Edema present.      Left lower leg: Edema present.      Comments: Trace   Skin:     General: Skin is warm and dry.      Coloration: Skin is not pale.      Findings: No erythema or rash.   Neurological:      Mental Status: She is alert and oriented to person, place, and time.      Cranial Nerves: No cranial nerve deficit.   Psychiatric:         Behavior: Behavior normal.         Procedure   Procedures       Assessment/Plan     Problems Addressed this Visit        Cardiac and Vasculature    Coronary artery disease involving native coronary artery of native heart without angina pectoris    Relevant Medications    clopidogrel (PLAVIX) 75 MG tablet    ranolazine (RANEXA) 1000 MG 12 hr tablet    Other Relevant Orders    Basic Metabolic Panel    Chest pain       Pulmonary and Pneumonias    Shortness of breath - Primary    Relevant Orders    Basic Metabolic Panel       Symptoms and Signs    Lower extremity edema    Relevant Orders    Basic Metabolic Panel      Diagnoses       Codes Comments    Shortness of breath    -  Primary  ICD-10-CM: R06.02  ICD-9-CM: 786.05     Coronary artery disease involving native coronary artery of native heart without angina pectoris     ICD-10-CM: I25.10  ICD-9-CM: 414.01     Lower extremity edema     ICD-10-CM: R60.0  ICD-9-CM: 782.3     Precordial pain     ICD-10-CM: R07.2  ICD-9-CM: 786.51             Recommendation  1.  Patient with coronary disease.  She is complaining of discomfort and has some trace lower extremity edema.  I have spoken with her grandson who would like to try medical therapy before any further evaluation because of cognitive state.  I am placing her on Ranexa to help with chest discomfort I want her to be on low-dose Lasix therapy.  We are putting her on 20 mg of Lasix and will check labs in 1 to 2 weeks to ensure that she is not having no azotemia or electrolyte abnormality    2.  We will start here and see her back in 3 to 4 weeks.  If symptoms were to worsen, they are to contact our office.  We will consider further testing pending patient's response to medical therapy.  Patient has nitroglycerin available for chest pain.  She is to follow with primary as scheduled       Niki PARRISH Thompson  reports that she quit smoking about 16 months ago. She quit after 62.00 years of use. She has never used smokeless tobacco..        Patient's Body mass index is 35 kg/m². BMI is above normal parameters. Recommendations include: educational material.     Advance Care Planning   ACP discussion was held with the patient during this visit. Patient does not have an advance directive, declines further assistance.    Electronically signed by:

## 2021-04-20 NOTE — PROGRESS NOTES
Problem list     Subjective   Niki Thompson is a 74 y.o. female     Chief Complaint   Patient presents with   • Follow-up   • Coronary Artery Disease         Problem List  1)CAD s/p stenting of the LAD and RCA inD distant past   a. Follow up cath in 2013 revealed moderate LAD disease but nonobstructive otherwise  B.  Echo 4/20: EF 50 to 55%, moderate concentric left ventricular hypertrophy with grade 2 diastolic dysfunction and moderate left atrial enlargement,  C.  Stress test 4/20: Mild inferior wall ischemia, mildly depressed post stress ejection fraction 47% with mild inferior septal and inferior hypokinesis  2)Hypertension  3)Dyslipidemia  4)Hx of ischemic cardiomyopathy but recent echo showing preserved systolic fxn  4.1 patient is status post [employer] AICD placement in the distant past  4)Severe hearing loss  5.  Dementia  6.  Paroxysmal atrial fibrillation started on Eliquis for anticoagulation  6.1 patient diagnosed during recent hospitalization in [facility] admitted for urinary tract infection and encephalopathy         HPI    Patient is a 74-year-old female that presents to the office for evaluation.  Last office visit we placed on diuretic therapy but also antianginal therapy.  She has significant dementia and they only want medical therapy    History is limited because of patient's cognitive state and frequent questions are answered by patient's daughter.  She has not complained of any pain or significant dyspnea.  Her edema has improved on diuretic therapy.  They do not complain of any PND orthopnea.  No complaints of palpitations or dysrhythmic symptoms.  She appears stable from the standpoint      Current Outpatient Medications on File Prior to Visit   Medication Sig Dispense Refill   • amiodarone (PACERONE) 200 MG tablet Take 1 tablet by mouth Daily. 90 tablet 1   • apixaban (ELIQUIS) 5 MG tablet tablet Take 1 tablet by mouth Every 12 (Twelve) Hours. 60 tablet 11     • aspirin 81 MG EC tablet Take 81 mg by mouth Daily.     • citalopram (CeleXA) 40 MG tablet Daily.     • clopidogrel (PLAVIX) 75 MG tablet TAKE ONE TABLET BY MOUTH EVERY DAY FOR BLOOD CIRCULATION 90 tablet 3   • clorazepate (TRANXENE) 7.5 MG tablet Take 7.5 mg by mouth 2 (Two) Times a Day.     • docusate sodium (COLACE) 250 MG capsule Take 250 mg by mouth Daily.     • furosemide (LASIX) 20 MG tablet Take 1 tablet by mouth Daily. 30 tablet 11   • isosorbide mononitrate (IMDUR) 60 MG 24 hr tablet Take 60 mg by mouth Daily.     • memantine (NAMENDA) 10 MG tablet Daily.     • metoprolol succinate XL (TOPROL-XL) 50 MG 24 hr tablet TAKE ONE TABLET BY MOUTH EVERY DAY FOR HEART AND BLOOD PRESSURE 30 tablet 11   • oxyCODONE-acetaminophen (Percocet)  MG per tablet Take 1 tablet by mouth Every 6 (Six) Hours As Needed for Moderate Pain .     • phenazopyridine (PYRIDIUM) 200 MG tablet Take 200 mg by mouth 3 (Three) Times a Day As Needed for Bladder Spasms.     • ranolazine (RANEXA) 1000 MG 12 hr tablet Take 1 tablet by mouth Every 12 (Twelve) Hours. 60 tablet 5   • risperiDONE (risperDAL) 0.5 MG tablet 2 (Two) Times a Day.     • sucralfate (CARAFATE) 1 g tablet Take 1 g by mouth 2 (Two) Times a Day.     • LINZESS 145 MCG capsule capsule Daily.     • lisinopril (PRINIVIL,ZESTRIL) 40 MG tablet Daily.     • methocarbamol (ROBAXIN) 750 MG tablet Take 750 mg by mouth 3 (Three) Times a Day.     • nitroglycerin (NITROSTAT) 0.4 MG SL tablet 1 under the tongue as needed for angina, may repeat q5mins for up three doses 100 tablet 11     No current facility-administered medications on file prior to visit.       Azithromycin, Indomethacin, Macrolides and ketolides, Penicillins, Prevacid [lansoprazole], Rocephin [ceftriaxone], Sulfa antibiotics, and Tetracyclines & related    Past Medical History:   Diagnosis Date   • CAD (coronary artery disease), native coronary artery 8/25/2016   • Chest pain 8/25/2016   • COPD (chronic  obstructive pulmonary disease) (CMS/[facility]) 2016   • Coronary artery stenosis 2016   • Dyslipidemia 2016   • Esophageal reflux 2016   • Hyperlipidemia 2016   • Hypertension 2016   • Ischemic cardiomyopathy 2016   • Osteoarthritis 2016   • Peripheral vascular disease (CMS/[facility]) 2016   • STEMI (ST elevation myocardial infarction) (CMS/HCC) 2016       Social History     Socioeconomic History   • Marital status:      Spouse name: Not on file   • Number of children: Not on file   • Years of education: Not on file   • Highest education level: Not on file   Tobacco Use   • Smoking status: Former Smoker     Years: 62.00     Quit date: 2019     Years since quittin.4   • Smokeless tobacco: Never Used   Substance and Sexual Activity   • Alcohol use: No   • Drug use: No   • Sexual activity: Defer       Family History   Problem Relation Age of Onset   • Heart attack Mother    • Heart attack Father    • Heart attack Sister    • Heart attack Brother    • Diabetes Other    • Coronary artery disease Other    • Seizures Other    • Heart disease Other    • Hyperlipidemia Other    • Hypertension Other    • Stroke Other        Review of Systems   Constitutional: Negative for chills, fatigue and fever.   HENT: Positive for hearing loss. Negative for congestion, rhinorrhea and sore throat.    Eyes: Negative for visual disturbance.   Respiratory: Positive for apnea (supplemental oxygen) and chest tightness. Negative for shortness of breath.    Cardiovascular: Positive for leg swelling. Negative for chest pain and palpitations.   Gastrointestinal: Negative.    Endocrine: Negative.    Genitourinary: Negative.    Musculoskeletal: Positive for back pain and gait problem (uses wheelchair). Negative for neck pain and neck stiffness.   Skin: Negative.  Negative for rash and wound.   Allergic/Immunologic: Negative.  Negative for environmental allergies and food allergies.  "  Neurological: Positive for weakness. Negative for dizziness, light-headedness, numbness and headaches.   Hematological: Bruises/bleeds easily.   Psychiatric/Behavioral: Negative.  Negative for sleep disturbance.       Objective   Vitals:    04/20/21 1333   BP: 133/78   BP Location: Left arm   Patient Position: Sitting   Pulse: 102   SpO2: 91%   Weight: 81.2 kg (179 lb)   Height: 162.6 cm (64.02\")      /78 (BP Location: Left arm, Patient Position: Sitting)   Pulse 102   Ht 162.6 cm (64.02\")   Wt 81.2 kg (179 lb)   SpO2 91%   BMI 30.71 kg/m²     Lab Results (most recent)     None          Physical Exam  Vitals and nursing note reviewed.   Constitutional:       General: She is not in acute distress.     Appearance: Normal appearance. She is well-developed.   HENT:      Head: Normocephalic and atraumatic.   Eyes:      General: No scleral icterus.        Right eye: No discharge.         Left eye: No discharge.      Conjunctiva/sclera: Conjunctivae normal.   Neck:      Vascular: No carotid bruit.   Cardiovascular:      Rate and Rhythm: Normal rate and regular rhythm.      Heart sounds: Normal heart sounds. No murmur heard.   No friction rub. No gallop.    Pulmonary:      Effort: Pulmonary effort is normal. No respiratory distress.      Breath sounds: Normal breath sounds. No wheezing or rales.   Chest:      Chest wall: No tenderness.   Musculoskeletal:      Right lower leg: Edema present.      Left lower leg: Edema present.   Skin:     General: Skin is warm and dry.      Coloration: Skin is not pale.      Findings: No erythema or rash.   Neurological:      Mental Status: She is alert and oriented to person, place, and time.      Cranial Nerves: No cranial nerve deficit.   Psychiatric:         Behavior: Behavior normal.         Procedure   Procedures       Assessment/Plan     Problems Addressed this Visit        Cardiac and Vasculature    Coronary artery disease involving native coronary artery of native heart " without angina pectoris - Primary    Relevant Medications    isosorbide mononitrate (IMDUR) 60 MG 24 hr tablet    Paroxysmal atrial fibrillation (CMS/HCC)    Relevant Medications    isosorbide mononitrate (IMDUR) 60 MG 24 hr tablet       Symptoms and Signs    Lower extremity edema      Diagnoses       Codes Comments    Coronary artery disease involving native coronary artery of native heart without angina pectoris    -  Primary ICD-10-CM: I25.10  ICD-9-CM: 414.01     Lower extremity edema     ICD-10-CM: R60.0  ICD-9-CM: 782.3     Paroxysmal atrial fibrillation (CMS/HCC)     ICD-10-CM: I48.0  ICD-9-CM: 427.31         Recommendation  1.  Patient with coronary artery disease doing remarkably well this time.  She does not describe of any ischemic symptoms.  Dyspnea seems to have improved.  She does not complain of chest pain.  We will continue current medical therapy    2.  Patient with lower extremity edema that has improved significantly.  We will continue current diuretic dosing    3.  Atrial fibrillation that is currently doing well on rhythm control therapy.  For now we will make no changes.  She does not complain of any bleeding on Eliquis.  We will see patient back for follow-up in 6 months to year.  Again we have had discussion in the past about invasive evaluation.  They only want medical therapy at this time.  Follow-up with primary as scheduled             Niki SENIOR Donna  reports that she quit smoking about 17 months ago. She quit after 62.00 years of use. She has never used smokeless tobacco.        Patient's Body mass index is 30.71 kg/m². BMI is above normal parameters. Recommendations include: educational material.       Advance Care Planning   ACP discussion was held with the patient during this visit. Patient has an advance directive (not in EMR), copy requested.    Electronically signed by:

## 2021-04-20 NOTE — PATIENT INSTRUCTIONS
Heart-Healthy Eating Plan  Heart-healthy meal planning includes:  · Eating less unhealthy fats.  · Eating more healthy fats.  · Making other changes in your diet.  Talk with your doctor or a diet specialist (dietitian) to create an eating plan that is right for you.  What is my plan?  Your doctor may recommend an eating plan that includes:  · Total fat: ______% or less of total calories a day.  · Saturated fat: ______% or less of total calories a day.  · Cholesterol: less than _________mg a day.  What are tips for following this plan?  Cooking  Avoid frying your food. Try to bake, boil, grill, or broil it instead. You can also reduce fat by:  · Removing the skin from poultry.  · Removing all visible fats from meats.  · Steaming vegetables in water or broth.  Meal planning    · At meals, divide your plate into four equal parts:  ? Fill one-half of your plate with vegetables and green salads.  ? Fill one-fourth of your plate with whole grains.  ? Fill one-fourth of your plate with lean protein foods.  · Eat 4-5 servings of vegetables per day. A serving of vegetables is:  ? 1 cup of raw or cooked vegetables.  ? 2 cups of raw leafy greens.  · Eat 4-5 servings of fruit per day. A serving of fruit is:  ? 1 medium whole fruit.  ? ¼ cup of dried fruit.  ? ½ cup of fresh, frozen, or canned fruit.  ? ½ cup of 100% fruit juice.  · Eat more foods that have soluble fiber. These are apples, broccoli, carrots, beans, peas, and barley. Try to get 20-30 g of fiber per day.  · Eat 4-5 servings of nuts, legumes, and seeds per week:  ? 1 serving of dried beans or legumes equals ½ cup after being cooked.  ? 1 serving of nuts is ¼ cup.  ? 1 serving of seeds equals 1 tablespoon.  General information  · Eat more home-cooked food. Eat less restaurant, buffet, and fast food.  · Limit or avoid alcohol.  · Limit foods that are high in starch and sugar.  · Avoid fried foods.  · Lose weight if you are overweight.  · Keep track of how much salt  (sodium) you eat. This is important if you have high blood pressure. Ask your doctor to tell you more about this.  · Try to add vegetarian meals each week.  Fats  · Choose healthy fats. These include olive oil and canola oil, flaxseeds, walnuts, almonds, and seeds.  · Eat more omega-3 fats. These include salmon, mackerel, sardines, tuna, flaxseed oil, and ground flaxseeds. Try to eat fish at least 2 times each week.  · Check food labels. Avoid foods with trans fats or high amounts of saturated fat.  · Limit saturated fats.  ? These are often found in animal products, such as meats, butter, and cream.  ? These are also found in plant foods, such as palm oil, palm kernel oil, and coconut oil.  · Avoid foods with partially hydrogenated oils in them. These have trans fats. Examples are stick margarine, some tub margarines, cookies, crackers, and other baked goods.  What foods can I eat?  Fruits  All fresh, canned (in natural juice), or frozen fruits.  Vegetables  Fresh or frozen vegetables (raw, steamed, roasted, or grilled). Green salads.  Grains  Most grains. Choose whole wheat and whole grains most of the time. Rice and pasta, including brown rice and pastas made with whole wheat.  Meats and other proteins  Lean, well-trimmed beef, veal, pork, and lamb. Chicken and turkey without skin. All fish and shellfish. Wild duck, rabbit, pheasant, and venison. Egg whites or low-cholesterol egg substitutes. Dried beans, peas, lentils, and tofu. Seeds and most nuts.  Dairy  Low-fat or nonfat cheeses, including ricotta and mozzarella. Skim or 1% milk that is liquid, powdered, or evaporated. Buttermilk that is made with low-fat milk. Nonfat or low-fat yogurt.  Fats and oils  Non-hydrogenated (trans-free) margarines. Vegetable oils, including soybean, sesame, sunflower, olive, peanut, safflower, corn, canola, and cottonseed. Salad dressings or mayonnaise made with a vegetable oil.  Beverages  Mineral water. Coffee and tea. Diet  carbonated beverages.  Sweets and desserts  Sherbet, gelatin, and fruit ice. Small amounts of dark chocolate.  Limit all sweets and desserts.  Seasonings and condiments  All seasonings and condiments.  The items listed above may not be a complete list of foods and drinks you can eat. Contact a dietitian for more options.  What foods should I avoid?  Fruits  Canned fruit in heavy syrup. Fruit in cream or butter sauce. Fried fruit. Limit coconut.  Vegetables  Vegetables cooked in cheese, cream, or butter sauce. Fried vegetables.  Grains  Breads that are made with saturated or trans fats, oils, or whole milk. Croissants. Sweet rolls. Donuts. High-fat crackers, such as cheese crackers.  Meats and other proteins  Fatty meats, such as hot dogs, ribs, sausage, caraballo, rib-eye roast or steak. High-fat deli meats, such as salami and bologna. Caviar. Domestic duck and goose. Organ meats, such as liver.  Dairy  Cream, sour cream, cream cheese, and creamed cottage cheese. Whole-milk cheeses. Whole or 2% milk that is liquid, evaporated, or condensed. Whole buttermilk. Cream sauce or high-fat cheese sauce. Yogurt that is made from whole milk.  Fats and oils  Meat fat, or shortening. Cocoa butter, hydrogenated oils, palm oil, coconut oil, palm kernel oil. Solid fats and shortenings, including caraballo fat, salt pork, lard, and butter. Nondairy cream substitutes. Salad dressings with cheese or sour cream.  Beverages  Regular sodas and juice drinks with added sugar.  Sweets and desserts  Frosting. Pudding. Cookies. Cakes. Pies. Milk chocolate or white chocolate. Buttered syrups. Full-fat ice cream or ice cream drinks.  The items listed above may not be a complete list of foods and drinks to avoid. Contact a dietitian for more information.  Summary  · Heart-healthy meal planning includes eating less unhealthy fats, eating more healthy fats, and making other changes in your diet.  · Eat a balanced diet. This includes fruits and  vegetables, low-fat or nonfat dairy, lean protein, nuts and legumes, whole grains, and heart-healthy oils and fats.  This information is not intended to replace advice given to you by your health care provider. Make sure you discuss any questions you have with your health care provider.  Document Revised: 02/21/2019 Document Reviewed: 01/25/2019  [employer] Patient Education © 2021 [employer]

## 2021-04-26 NOTE — TELEPHONE ENCOUNTER
Left voicemail for Rachel MORA, to return call to clinic.     Requested results of CT abd/pel w/ wo contrast. Per Davon, give patient aneursym signs to monitor for. Abdominal pain, syncope, etc.    Sooner follow up made 05/17/21 @ [id number].

## 2021-04-27 NOTE — TELEPHONE ENCOUNTER
Rachel HODGES returning call.       I called Akilah(Davon's assistant) for clarification, she stated pt had a CT abd/pel w/o contrast, which was ordered by Navi Scanlon and that Davon wants someone to speak w/ pt's PoA regd the cardiac portion of those results.   Stated imaging showed:  Advanced atherosclerotic disease and an aneurysm- then several things PCP needs to address: bladder thickened, abnormal amount of gas noted, etc.   Pt needs to be made aware of sooner appt and be told what sx to watch for.        Called pt's PoA and informed her that imaging showed several things that will need to be addressed w/ Dr. Scanlon, but it does show aneurysm and some advanced atherosclerotic disease. Went over sx to watch for and new appt date and time.   She stated that pt had an issue w/ her WC yesterday and the tire got stuck and pt fell out of chair, states pt may be bruised up at appt.

## 2021-05-17 NOTE — PATIENT INSTRUCTIONS
Heart-Healthy Eating Plan  Heart-healthy meal planning includes:  · Eating less unhealthy fats.  · Eating more healthy fats.  · Making other changes in your diet.  Talk with your doctor or a diet specialist (dietitian) to create an eating plan that is right for you.  What is my plan?  Your doctor may recommend an eating plan that includes:  · Total fat: ______% or less of total calories a day.  · Saturated fat: ______% or less of total calories a day.  · Cholesterol: less than _________mg a day.  What are tips for following this plan?  Cooking  Avoid frying your food. Try to bake, boil, grill, or broil it instead. You can also reduce fat by:  · Removing the skin from poultry.  · Removing all visible fats from meats.  · Steaming vegetables in water or broth.  Meal planning    · At meals, divide your plate into four equal parts:  ? Fill one-half of your plate with vegetables and green salads.  ? Fill one-fourth of your plate with whole grains.  ? Fill one-fourth of your plate with lean protein foods.  · Eat 4-5 servings of vegetables per day. A serving of vegetables is:  ? 1 cup of raw or cooked vegetables.  ? 2 cups of raw leafy greens.  · Eat 4-5 servings of fruit per day. A serving of fruit is:  ? 1 medium whole fruit.  ? ¼ cup of dried fruit.  ? ½ cup of fresh, frozen, or canned fruit.  ? ½ cup of 100% fruit juice.  · Eat more foods that have soluble fiber. These are apples, broccoli, carrots, beans, peas, and barley. Try to get 20-30 g of fiber per day.  · Eat 4-5 servings of nuts, legumes, and seeds per week:  ? 1 serving of dried beans or legumes equals ½ cup after being cooked.  ? 1 serving of nuts is ¼ cup.  ? 1 serving of seeds equals 1 tablespoon.  General information  · Eat more home-cooked food. Eat less restaurant, buffet, and fast food.  · Limit or avoid alcohol.  · Limit foods that are high in starch and sugar.  · Avoid fried foods.  · Lose weight if you are overweight.  · Keep track of how much salt  (sodium) you eat. This is important if you have high blood pressure. Ask your doctor to tell you more about this.  · Try to add vegetarian meals each week.  Fats  · Choose healthy fats. These include olive oil and canola oil, flaxseeds, walnuts, almonds, and seeds.  · Eat more omega-3 fats. These include salmon, mackerel, sardines, tuna, flaxseed oil, and ground flaxseeds. Try to eat fish at least 2 times each week.  · Check food labels. Avoid foods with trans fats or high amounts of saturated fat.  · Limit saturated fats.  ? These are often found in animal products, such as meats, butter, and cream.  ? These are also found in plant foods, such as palm oil, palm kernel oil, and coconut oil.  · Avoid foods with partially hydrogenated oils in them. These have trans fats. Examples are stick margarine, some tub margarines, cookies, crackers, and other baked goods.  What foods can I eat?  Fruits  All fresh, canned (in natural juice), or frozen fruits.  Vegetables  Fresh or frozen vegetables (raw, steamed, roasted, or grilled). Green salads.  Grains  Most grains. Choose whole wheat and whole grains most of the time. Rice and pasta, including brown rice and pastas made with whole wheat.  Meats and other proteins  Lean, well-trimmed beef, veal, pork, and lamb. Chicken and turkey without skin. All fish and shellfish. Wild duck, rabbit, pheasant, and venison. Egg whites or low-cholesterol egg substitutes. Dried beans, peas, lentils, and tofu. Seeds and most nuts.  Dairy  Low-fat or nonfat cheeses, including ricotta and mozzarella. Skim or 1% milk that is liquid, powdered, or evaporated. Buttermilk that is made with low-fat milk. Nonfat or low-fat yogurt.  Fats and oils  Non-hydrogenated (trans-free) margarines. Vegetable oils, including soybean, sesame, sunflower, olive, peanut, safflower, corn, canola, and cottonseed. Salad dressings or mayonnaise made with a vegetable oil.  Beverages  Mineral water. Coffee and tea. Diet  carbonated beverages.  Sweets and desserts  Sherbet, gelatin, and fruit ice. Small amounts of dark chocolate.  Limit all sweets and desserts.  Seasonings and condiments  All seasonings and condiments.  The items listed above may not be a complete list of foods and drinks you can eat. Contact a dietitian for more options.  What foods should I avoid?  Fruits  Canned fruit in heavy syrup. Fruit in cream or butter sauce. Fried fruit. Limit coconut.  Vegetables  Vegetables cooked in cheese, cream, or butter sauce. Fried vegetables.  Grains  Breads that are made with saturated or trans fats, oils, or whole milk. Croissants. Sweet rolls. Donuts. High-fat crackers, such as cheese crackers.  Meats and other proteins  Fatty meats, such as hot dogs, ribs, sausage, caraballo, rib-eye roast or steak. High-fat deli meats, such as salami and bologna. Caviar. Domestic duck and goose. Organ meats, such as liver.  Dairy  Cream, sour cream, cream cheese, and creamed cottage cheese. Whole-milk cheeses. Whole or 2% milk that is liquid, evaporated, or condensed. Whole buttermilk. Cream sauce or high-fat cheese sauce. Yogurt that is made from whole milk.  Fats and oils  Meat fat, or shortening. Cocoa butter, hydrogenated oils, palm oil, coconut oil, palm kernel oil. Solid fats and shortenings, including caraballo fat, salt pork, lard, and butter. Nondairy cream substitutes. Salad dressings with cheese or sour cream.  Beverages  Regular sodas and juice drinks with added sugar.  Sweets and desserts  Frosting. Pudding. Cookies. Cakes. Pies. Milk chocolate or white chocolate. Buttered syrups. Full-fat ice cream or ice cream drinks.  The items listed above may not be a complete list of foods and drinks to avoid. Contact a dietitian for more information.  Summary  · Heart-healthy meal planning includes eating less unhealthy fats, eating more healthy fats, and making other changes in your diet.  · Eat a balanced diet. This includes fruits and  vegetables, low-fat or nonfat dairy, lean protein, nuts and legumes, whole grains, and heart-healthy oils and fats.  This information is not intended to replace advice given to you by your health care provider. Make sure you discuss any questions you have with your health care provider.  Document Revised: 02/21/2019 Document Reviewed: 01/25/2019  [employer] Patient Education © 2021 [employer]

## 2021-05-17 NOTE — PROGRESS NOTES
Problem list     Subjective   Niki Thompson is a 74 y.o. female     Chief Complaint   Patient presents with   • Follow-up   • Coronary Artery Disease        Problem List  1)CAD s/p stenting of the LAD and RCA inD distant past   a. Follow up cath in 2013 revealed moderate LAD disease but nonobstructive otherwise  B.  Echo 4/20: EF 50 to 55%, moderate concentric left ventricular hypertrophy with grade 2 diastolic dysfunction and moderate left atrial enlargement,  C.  Stress test 4/20: Mild inferior wall ischemia, mildly depressed post stress ejection fraction 47% with mild inferior septal and inferior hypokinesis  2)Hypertension  3)Dyslipidemia  4)Hx of ischemic cardiomyopathy but recent echo showing preserved systolic fxn  4.1 patient is status post [employer] AICD placement in the distant past  4)Severe hearing loss  5.  Dementia  6.  Paroxysmal atrial fibrillation started on Eliquis for anticoagulation  6.1 patient diagnosed during recent hospitalization in [facility] admitted for urinary tract infection and encephalopathy         HPI      Patient is a 74-year-old female that presents back to the office for follow-up.  Her past medical history as above is significant for coronary disease and ischemic cardiomyopathy.  She is status post AICD in the distant past.  She also has dementia and is accompanied by daughter who aids in history    She recently had CT examination of the abdomen.  There was evidence of a 4.6 cm abdominal aneurysm.    Patient has done well.  Daughter brings a issues with her mother becomes agitated.  Apparently there is some family issues.  When talking with the patient she does describe having some type of discomfort in her chest.  It does not appear to be severe but details on her history is difficult to obtain.  Patient has mild levels of dyspnea but does not describe any progressive symptoms.  She does not describe any PND orthopnea.    No complaints of  bleeding on Eliquis.  Otherwise appears stable today    Current Outpatient Medications on File Prior to Visit   Medication Sig Dispense Refill   • amiodarone (PACERONE) 200 MG tablet Take 1 tablet by mouth Daily. 90 tablet 1   • apixaban (ELIQUIS) 5 MG tablet tablet Take 1 tablet by mouth Every 12 (Twelve) Hours. 60 tablet 11   • aspirin 81 MG EC tablet Take 81 mg by mouth Daily.     • citalopram (CeleXA) 40 MG tablet Daily.     • clopidogrel (PLAVIX) 75 MG tablet TAKE ONE TABLET BY MOUTH EVERY DAY FOR BLOOD CIRCULATION 90 tablet 3   • clorazepate (TRANXENE) 7.5 MG tablet Take 7.5 mg by mouth 2 (Two) Times a Day.     • docusate sodium (COLACE) 250 MG capsule Take 250 mg by mouth Daily.     • furosemide (LASIX) 20 MG tablet Take 1 tablet by mouth Daily. 30 tablet 11   • gabapentin (NEURONTIN) 600 MG tablet Take 300 mg by mouth As Needed.     • isosorbide mononitrate (IMDUR) 60 MG 24 hr tablet Take 60 mg by mouth Daily.     • LINZESS 145 MCG capsule capsule Daily.     • lisinopril (PRINIVIL,ZESTRIL) 40 MG tablet Daily.     • memantine (NAMENDA) 10 MG tablet Daily.     • methocarbamol (ROBAXIN) 750 MG tablet Take 750 mg by mouth 3 (Three) Times a Day.     • metoprolol succinate XL (TOPROL-XL) 50 MG 24 hr tablet TAKE ONE TABLET BY MOUTH EVERY DAY FOR HEART AND BLOOD PRESSURE 30 tablet 11   • nitroglycerin (NITROSTAT) 0.4 MG SL tablet 1 under the tongue as needed for angina, may repeat q5mins for up three doses 100 tablet 11   • oxyCODONE-acetaminophen (Percocet)  MG per tablet Take 1 tablet by mouth Every 6 (Six) Hours As Needed for Moderate Pain .     • phenazopyridine (PYRIDIUM) 200 MG tablet Take 200 mg by mouth 3 (Three) Times a Day As Needed for Bladder Spasms.     • potassium chloride (KLOR-CON) 20 MEQ packet Take 20 mEq by mouth 2 (Two) Times a Day.     • ranolazine (RANEXA) 1000 MG 12 hr tablet Take 1 tablet by mouth Every 12 (Twelve) Hours. 60 tablet 5   • risperiDONE (risperDAL) 0.5 MG tablet 2 (Two)  Times a Day.     • sucralfate (CARAFATE) 1 g tablet Take 1 g by mouth 2 (Two) Times a Day.       No current facility-administered medications on file prior to visit.       Azithromycin, Indomethacin, Macrolides and ketolides, Penicillins, Prevacid [lansoprazole], Rocephin [ceftriaxone], Sulfa antibiotics, and Tetracyclines & related    Past Medical History:   Diagnosis Date   • CAD (coronary artery disease), native coronary artery 2016   • Chest pain 2016   • [place] [place]   • Coronary artery stenosis 2016   • Dyslipidemia 2016   • Esophageal reflux 2016   • Hyperlipidemia 2016   • Hypertension 2016   • Ischemic cardiomyopathy 2016   • Osteoarthritis 2016   • Peripheral vascular disease (CMS/HCC) 2016   • STEMI (ST elevation myocardial infarction) (CMS/HCC) 2016       Social History     Socioeconomic History   • Marital status:      Spouse name: Not on file   • Number of children: Not on file   • Years of education: Not on file   • Highest education level: Not on file   Tobacco Use   • Smoking status: Former Smoker     Years: 62.00     Quit date: 2019     Years since quittin.4   • Smokeless tobacco: Never Used   Substance and Sexual Activity   • Alcohol use: No   • Drug use: No   • Sexual activity: Defer       Family History   Problem Relation Age of Onset   • Heart attack Mother    • Heart attack Father    • Heart attack Sister    • Heart attack Brother    • Diabetes Other    • Coronary artery disease Other    • Seizures Other    • Heart disease Other    • Hyperlipidemia Other    • Hypertension Other    • Stroke Other        Review of Systems   Constitutional: Negative for chills, fatigue and fever.        Went over meds verbally. Did not bring meds/list.    HENT: Negative for congestion, rhinorrhea and sore throat.    Respiratory: Positive for chest tightness and shortness of breath.   "  Cardiovascular: Positive for chest pain and leg swelling. Negative for palpitations.   Gastrointestinal: Negative.    Endocrine: Negative.    Genitourinary: Negative.    Musculoskeletal: Positive for gait problem (uses wheelchair). Negative for back pain, neck pain and neck stiffness.   Skin: Negative.  Negative for rash and wound.   Allergic/Immunologic: Negative.  Negative for environmental allergies and food allergies.   Neurological: Positive for weakness and headaches. Negative for dizziness, light-headedness and numbness.   Hematological: Bruises/bleeds easily.       Objective   Vitals:    05/17/21 1113   BP: 99/56   BP Location: Left arm   Patient Position: Sitting   Pulse: 104   SpO2: 91%   Weight: 78.5 kg (173 lb)   Height: 162.6 cm (64.02\")      BP 99/56 (BP Location: Left arm, Patient Position: Sitting)   Pulse 104   Ht 162.6 cm (64.02\")   Wt 78.5 kg (173 lb) Comment: daughter stated- refused weight  SpO2 91%   BMI 29.68 kg/m²     Lab Results (most recent)     None          Physical Exam  Vitals and nursing note reviewed.   Constitutional:       General: She is not in acute distress.     Appearance: Normal appearance. She is well-developed.   HENT:      Head: Normocephalic and atraumatic.   Eyes:      General: No scleral icterus.        Right eye: No discharge.         Left eye: No discharge.      Conjunctiva/sclera: Conjunctivae normal.   Neck:      Vascular: No carotid bruit.   Cardiovascular:      Rate and Rhythm: Normal rate and regular rhythm.      Heart sounds: Normal heart sounds. No murmur heard.   No friction rub. No gallop.    Pulmonary:      Effort: Pulmonary effort is normal. No respiratory distress.      Breath sounds: Normal breath sounds. No wheezing or rales.   Chest:      Chest wall: No tenderness.   Musculoskeletal:      Right lower leg: No edema.      Left lower leg: No edema.   Skin:     General: Skin is warm and dry.      Coloration: Skin is not pale.      Findings: No erythema " or rash.   Neurological:      Mental Status: She is alert and oriented to person, place, and time.      Cranial Nerves: No cranial nerve deficit.   Psychiatric:         Behavior: Behavior normal.         Procedure   Procedures       Assessment/Plan     Problems Addressed this Visit        Cardiac and Vasculature    Hypertension    Relevant Orders    CT Abdomen With & Without Contrast    Stress Test With Myocardial Perfusion One Day    Chest pain    Relevant Orders    Stress Test With Myocardial Perfusion One Day    Abdominal aortic aneurysm (AAA) without rupture (CMS/HCC) - Primary    Relevant Orders    CT Abdomen With & Without Contrast    Stress Test With Myocardial Perfusion One Day       Pulmonary and Pneumonias    Shortness of breath    Relevant Orders    Stress Test With Myocardial Perfusion One Day      Diagnoses       Codes Comments    Abdominal aortic aneurysm (AAA) without rupture (CMS/HCC)    -  Primary ICD-10-CM: I71.4  ICD-9-CM: 441.4     Essential hypertension     ICD-10-CM: I10  ICD-9-CM: 401.9     Chest pain, unspecified type     ICD-10-CM: R07.9  ICD-9-CM: 786.50     Shortness of breath     ICD-10-CM: R06.02  ICD-9-CM: 786.05         Recommendation  1.  Patient with abdominal aortic aneurysm.  I have tried to have a discussion with the family.  It appears they would like this monitored and we will repeat a scan in a few months to ensure no growth    2.  Regards to her symptoms of chest pain and dyspnea, we discussed testing but I have had difficulty in trying to understand if that would like to proceed with testing.  They seem to want evaluation.  We will schedule stress test to evaluate.  Regards to her cognitive state, we wanted to try to assess on how invasive they would like to become but it appears they do want monitoring    3.  We will schedule testing and see her back for follow-up.  She has nitroglycerin available for chest pain.  Follow-up with primary as scheduled    Addendum  1.  Patient  has dementia and is at increased risk for falls.  She has mobility issues and has severe hearing impairment.  We would like patient to get a wheelchair because of her mobility issues and cognitive state.  We do not feel that a walker or cane would be appropriate because of her current severe state.  We will put in the appropriate orders and see patient back for follow-up      Advance Care Planning   ACP discussion was held with the patient during this visit. Patient has an advance directive (not in EMR), copy requested.       Electronically signed by:

## 2021-05-18 PROBLEM — I71.40 ABDOMINAL AORTIC ANEURYSM (AAA) WITHOUT RUPTURE (HCC): Status: ACTIVE | Noted: 2021-01-01

## 2021-06-14 NOTE — TELEPHONE ENCOUNTER
"Patients daughter called office stating patiens blood pressure today 234/113, 219/97, reports patient having \"rattling\" in chest, edema in feet and legs. Instructed patients daughter to take her nearest ER. Rachel verbalized understanding. Janette Lawson LPN    "

## 2021-06-24 NOTE — TELEPHONE ENCOUNTER
Pt's daughter LVM stating that she purchased OTC meds for pt and want to make sure they're okay for her to take before she gives them to pt.       I called and asked what OTC rx she was talking about, she stated it's OTC Diphenhydramine 25mg at HS.

## 2021-06-25 NOTE — TELEPHONE ENCOUNTER
Called and informed pt's daughter that pt can take rx occasionally and to see how she tolerates it. She verbalized understanding.

## 2021-07-07 NOTE — TELEPHONE ENCOUNTER
Patients daughter Rachel informed patient needs abdominal CT. They will be called with date and time, once scheduled. Rachel verbalized understanding. BMP order input. Janette Lawson LPN      ----- Message from LINDA Sierra sent at 7/6/2021 10:10 AM EDT -----  Routine follow-up  Duplex Abdominal Aorta & Iliac Artery Limited CAR  Order: [id number]  Status:  Final result   Visible to patient:  No (not released) Dx:  Abdominal aortic aneurysm (AAA) witho...  Details    Reading Physician Reading Date Result Priority   Brennan Antonio MD  [phone] 7/1/2021 Routine      Result Text  Technically limited images.     1.  Mild proximal abdominal aortic ectasia.  Maximal diameter is approximately 3 cm.     2.  Mild ectasia of the infrarenal abdominal aortic aneurysm with a 2.7 cm diameter.  Mild atheroma is identified.  There is no evidence of dissection or perivascular fluid.     3.  The proximal iliac arteries are patent  All Measurements

## 2021-07-27 NOTE — TELEPHONE ENCOUNTER
----- Message from LINDA Sierra sent at 7/27/2021 10:10 AM EDT -----  Follow-up 3 to 4 months.  Aneurysm is stable.  Let patient know

## 2021-08-17 NOTE — PROGRESS NOTES
Problem list     Subjective   Niki Thompson is a 75 y.o. female     Chief Complaint   Patient presents with   • Coronary Artery Disease     CT of abd 7/23/21   • abdominal aortic aneursym         Problem List  1)CAD s/p stenting of the LAD and RCA inD distant past   a. Follow up cath in 2013 revealed moderate LAD disease but nonobstructive otherwise  B.  Echo 4/20: EF 50 to 55%, moderate concentric left ventricular hypertrophy with grade 2 diastolic dysfunction and moderate left atrial enlargement,  C.  Stress test 4/20: Mild inferior wall ischemia, mildly depressed post stress ejection fraction 47% with mild inferior septal and inferior hypokinesis  2)Hypertension  3)Dyslipidemia  4)Hx of ischemic cardiomyopathy but recent echo showing preserved systolic fxn  4.1 patient is status post [employer] AICD placement in the distant past  4)Severe hearing loss  5.  Dementia  6.  Paroxysmal atrial fibrillation started on Eliquis for anticoagulation  6.1 patient diagnosed during recent hospitalization in [facility] admitted for urinary tract infection and encephalopathy         HPI    Patient is a 75-year-old female that presents to the office for evaluation.  She has done well.  She has no complaints of discomfort and has shortness of breath and has been more edematous.  She apparently was on Lasix at a higher milligram in the past but that was decreased.  She has noticed worsening lower extremity edema and has been short of breath.  She is not in any respiratory distress today.  She is breathing normally.  She does not describe PND orthopnea.    Patient does not complain of palpitations or dysrhythmic symptoms.  Otherwise appears stable          Current Outpatient Medications on File Prior to Visit   Medication Sig Dispense Refill   • amiodarone (PACERONE) 200 MG tablet Take 1 tablet by mouth Daily. 90 tablet 1   • citalopram (CeleXA) 20 MG tablet Take 20 mg by mouth Daily.     •  clorazepate (TRANXENE) 7.5 MG tablet Take 7.5 mg by mouth As Needed.     • famotidine (PEPCID) 40 MG tablet Take 1 tablet by mouth Daily. 90 tablet 3   • isosorbide mononitrate (IMDUR) 60 MG 24 hr tablet TAKE ONE TABLET BY MOUTH EVERY MORNING FOR HEART AND BLOOD PRESSURE 30 tablet 1   • levothyroxine (SYNTHROID, LEVOTHROID) 50 MCG tablet Take 50 mcg by mouth Daily.     • lubiprostone (Amitiza) 24 MCG capsule Take 24 mcg by mouth 2 (Two) Times a Day With Meals.     • memantine (NAMENDA) 10 MG tablet Daily.     • metoprolol succinate XL (TOPROL-XL) 50 MG 24 hr tablet TAKE ONE TABLET BY MOUTH EVERY DAY FOR HEART AND BLOOD PRESSURE (Patient taking differently: As Needed.) 30 tablet 11   • nitroglycerin (NITROSTAT) 0.4 MG SL tablet 1 under the tongue as needed for angina, may repeat q5mins for up three doses 100 tablet 11   • O2 (OXYGEN) Inhale 2 L/min 1 (One) Time.     • potassium chloride (K-DUR,KLOR-CON) 20 MEQ CR tablet TAKE ONE TABLET BY MOUTH EVERY DAY FOR POTASSIUM REPLACEMENT 30 tablet 4   • potassium chloride (KLOR-CON) 20 MEQ packet Take 20 mEq by mouth 2 (Two) Times a Day.     • risperiDONE (risperDAL) 0.5 MG tablet 2 (Two) Times a Day.     • sucralfate (CARAFATE) 1 g tablet Take 1 g by mouth 2 (Two) Times a Day.     • [DISCONTINUED] furosemide (LASIX) 20 MG tablet Take 1 tablet by mouth Daily. 30 tablet 11   • [DISCONTINUED] apixaban (ELIQUIS) 5 MG tablet tablet Take 1 tablet by mouth Every 12 (Twelve) Hours. 60 tablet 11   • [DISCONTINUED] aspirin 81 MG EC tablet Take 81 mg by mouth Daily.     • [DISCONTINUED] clopidogrel (PLAVIX) 75 MG tablet TAKE ONE TABLET BY MOUTH EVERY DAY FOR BLOOD CIRCULATION 90 tablet 3   • [DISCONTINUED] docusate sodium (COLACE) 250 MG capsule Take 250 mg by mouth Daily.     • [DISCONTINUED] gabapentin (NEURONTIN) 600 MG tablet Take 300 mg by mouth As Needed.     • [DISCONTINUED] LINZESS 145 MCG capsule capsule Daily.     • [DISCONTINUED] lisinopril (PRINIVIL,ZESTRIL) 40 MG tablet  Daily.     • [DISCONTINUED] methocarbamol (ROBAXIN) 750 MG tablet Take 750 mg by mouth 3 (Three) Times a Day.     • [DISCONTINUED] oxyCODONE-acetaminophen (Percocet)  MG per tablet Take 1 tablet by mouth Every 6 (Six) Hours As Needed for Moderate Pain .     • [DISCONTINUED] phenazopyridine (PYRIDIUM) 200 MG tablet Take 200 mg by mouth 3 (Three) Times a Day As Needed for Bladder Spasms.     • [DISCONTINUED] ranolazine (RANEXA) 1000 MG 12 hr tablet Take 1 tablet by mouth Every 12 (Twelve) Hours. 60 tablet 5     No current facility-administered medications on file prior to visit.       Azithromycin, Indomethacin, Macrolides and ketolides, Penicillins, Prevacid [lansoprazole], Rocephin [ceftriaxone], Sulfa antibiotics, and Tetracyclines & related    Past Medical History:   Diagnosis Date   • CAD (coronary artery disease), native coronary artery 2016   • Chest pain 2016   • COPD (chronic obstructive pulmonary disease) (CMS/[facility]) 2016   • Coronary artery stenosis 2016   • Dyslipidemia 2016   • Esophageal reflux 2016   • Hyperlipidemia 2016   • Hypertension 2016   • Ischemic cardiomyopathy 2016   • Osteoarthritis 2016   • Peripheral vascular disease (CMS/[facility]) 2016   • STEMI (ST elevation myocardial infarction) (CMS/HCC) 2016       Social History     Socioeconomic History   • Marital status:      Spouse name: Not on file   • Number of children: Not on file   • Years of education: Not on file   • Highest education level: Not on file   Tobacco Use   • Smoking status: Former Smoker     Years: 62.00     Quit date: 2019     Years since quittin.7   • Smokeless tobacco: Never Used   Substance and Sexual Activity   • Alcohol use: No   • Drug use: No   • Sexual activity: Defer       Family History   Problem Relation Age of Onset   • Heart attack Mother    • Heart attack Father    • Heart attack Sister    • Heart attack Brother    • Diabetes Other    •  "Coronary artery disease Other    • Seizures Other    • Heart disease Other    • Hyperlipidemia Other    • Hypertension Other    • Stroke Other        Review of Systems   Constitutional: Positive for fatigue. Negative for chills and fever.   HENT: Negative.  Negative for congestion, sinus pressure and sore throat.    Eyes: Negative.  Negative for visual disturbance.   Respiratory: Positive for shortness of breath. Negative for chest tightness.    Cardiovascular: Positive for palpitations (flutters at times) and leg swelling. Negative for chest pain.   Gastrointestinal: Positive for constipation and nausea. Negative for abdominal pain, blood in stool, diarrhea and vomiting.   Endocrine: Positive for cold intolerance. Negative for heat intolerance.   Genitourinary: Positive for enuresis and frequency. Negative for dysuria, hematuria and urgency.   Musculoskeletal: Positive for back pain, gait problem and neck stiffness. Negative for arthralgias.   Skin: Positive for wound (bruises).   Allergic/Immunologic: Negative.  Negative for environmental allergies and food allergies.   Neurological: Negative for dizziness, syncope and light-headedness.   Hematological: Bruises/bleeds easily.   Psychiatric/Behavioral: Positive for sleep disturbance (oxygen, wakes with soa and cp at times).       Objective   Vitals:    08/17/21 1506   BP: 141/73   BP Location: Left arm   Patient Position: Sitting   Pulse: 84   SpO2: (!) 86%   Weight: 90.7 kg (200 lb)   Height: 162.6 cm (64\")      /73 (BP Location: Left arm, Patient Position: Sitting)   Pulse 84   Ht 162.6 cm (64\")   Wt 90.7 kg (200 lb) Comment: weighed in chair  SpO2 (!) 86% Comment: not wearing oxygen today, offered oxygen to patient, decline  BMI 34.33 kg/m²     Lab Results (most recent)     None          Physical Exam  Vitals and nursing note reviewed.   Constitutional:       General: She is not in acute distress.     Appearance: Normal appearance. She is " well-developed.   HENT:      Head: Normocephalic and atraumatic.   Eyes:      General: No scleral icterus.        Right eye: No discharge.         Left eye: No discharge.      Conjunctiva/sclera: Conjunctivae normal.   Neck:      Vascular: No carotid bruit.   Cardiovascular:      Rate and Rhythm: Normal rate and regular rhythm.      Heart sounds: Normal heart sounds. No murmur heard.   No friction rub. No gallop.    Pulmonary:      Effort: Pulmonary effort is normal. No respiratory distress.      Breath sounds: Normal breath sounds. No wheezing or rales.   Chest:      Chest wall: No tenderness.   Musculoskeletal:      Right lower leg: Edema present.      Left lower leg: Edema present.   Skin:     General: Skin is warm and dry.      Coloration: Skin is not pale.      Findings: No erythema or rash.   Neurological:      Mental Status: She is alert and oriented to person, place, and time.      Cranial Nerves: No cranial nerve deficit.   Psychiatric:         Behavior: Behavior normal.         Procedure   Procedures       Assessment/Plan     Problems Addressed this Visit        Cardiac and Vasculature    Paroxysmal atrial fibrillation (CMS/HCC)    Relevant Orders    Basic Metabolic Panel    proBNP    Magnesium       Pulmonary and Pneumonias    Shortness of breath - Primary    Relevant Orders    Basic Metabolic Panel    proBNP    Magnesium       Symptoms and Signs    Lower extremity edema    Relevant Orders    Basic Metabolic Panel    proBNP    Magnesium      Diagnoses       Codes Comments    Shortness of breath    -  Primary ICD-10-CM: R06.02  ICD-9-CM: 786.05     Lower extremity edema     ICD-10-CM: R60.0  ICD-9-CM: 782.3     Paroxysmal atrial fibrillation (CMS/HCC)     ICD-10-CM: I48.0  ICD-9-CM: 427.31           Recommendation  1.  Patient is a 75-year-old female that presents to the office for evaluation and is doing well.  She apparently has been off of some of her medication.  We discussed anticoagulation as well as  the risk of bleeding.  Daughter would like for her to be back on it.  Patient is a demented state.  We will put her back on Eliquis and atorvastatin.    2.  Lower extremity edema noted.  It is 1 placed today.  I do not appreciate any crackles on examination.  I am increasing Lasix to 40 mg and will check labs in 1 week to ensure no azotemia electrolyte abnormality.  In regards to patient's oxygen level today, I feel it is erroneous.  Patient is not in any respiratory distress with no labored breathing or issues at this point.  She is breathing normally    3.  Otherwise patient doing well on amiodarone.  We will continue the same and see patient back for follow-up in a few months.  If symptoms worsen, i.e. swelling dyspnea etc., as discussed with family, they are to contact our office.  For worsening dyspnea or edema, that we recommend going to the ER.  Follow-up with primary as scheduled         Patient brought in medicine bottles to appointment, they have been gone through with the patient. Med list was updated in the chart.     Advance Care Planning   ACP discussion was held with the patient during this visit. Patient does not have an advance directive, declines further assistance.    Electronically signed by:

## 2021-08-17 NOTE — PATIENT INSTRUCTIONS

## 2021-11-22 NOTE — TELEPHONE ENCOUNTER
Pts daughter call and wanted to know if she should increase her mothers Potassium due to her Lasix being increased

## 2021-11-22 NOTE — TELEPHONE ENCOUNTER
----- Message from Angelica Flores MA sent at 11/22/2021  9:34 AM EST -----    ----- Message -----  From: Sal Varma APRN  Sent: 11/22/2021   8:34 AM EST  To: Angelica Flores MA    Elevated glucose.  Renal functions comparable.  Keep follow-up.  ----- Message -----  From: Lab, Background User  Sent: 11/17/2021   6:42 PM EST  To: NANDO Good

## 2021-11-22 NOTE — TELEPHONE ENCOUNTER
Per JR Varma- Continue Potassium the same for now,Recheck BMP in 2 weeks then we will go from there. Pt verbally understands order in chart.